# Patient Record
Sex: FEMALE | Race: WHITE | HISPANIC OR LATINO | Employment: UNEMPLOYED | URBAN - METROPOLITAN AREA
[De-identification: names, ages, dates, MRNs, and addresses within clinical notes are randomized per-mention and may not be internally consistent; named-entity substitution may affect disease eponyms.]

---

## 2017-10-03 PROBLEM — B02.21 RAMSAY HUNT SYNDROME (GENICULATE HERPES ZOSTER): Status: ACTIVE | Noted: 2017-10-03

## 2021-08-01 ENCOUNTER — HOME HEALTH ADMISSION (OUTPATIENT)
Dept: HOME HEALTH SERVICES | Facility: HOME HEALTHCARE | Age: 61
End: 2021-08-01

## 2022-12-02 ENCOUNTER — HOSPITAL ENCOUNTER (OUTPATIENT)
Facility: MEDICAL CENTER | Age: 62
End: 2022-12-02
Attending: SURGERY | Admitting: SURGERY
Payer: COMMERCIAL

## 2022-12-08 ENCOUNTER — PRE-ADMISSION TESTING (OUTPATIENT)
Dept: ADMISSIONS | Facility: MEDICAL CENTER | Age: 62
End: 2022-12-08
Attending: SURGERY
Payer: COMMERCIAL

## 2022-12-08 VITALS — WEIGHT: 122.14 LBS | HEIGHT: 62 IN | BODY MASS INDEX: 22.48 KG/M2

## 2022-12-08 DIAGNOSIS — Z01.810 PRE-OPERATIVE CARDIOVASCULAR EXAMINATION: ICD-10-CM

## 2022-12-08 DIAGNOSIS — Z01.812 PRE-OPERATIVE LABORATORY EXAMINATION: ICD-10-CM

## 2022-12-08 LAB
ANION GAP SERPL CALC-SCNC: 14 MMOL/L (ref 7–16)
BASOPHILS # BLD AUTO: 1.2 % (ref 0–1.8)
BASOPHILS # BLD: 0.08 K/UL (ref 0–0.12)
BUN SERPL-MCNC: 29 MG/DL (ref 8–22)
CALCIUM SERPL-MCNC: 11.1 MG/DL (ref 8.5–10.5)
CHLORIDE SERPL-SCNC: 88 MMOL/L (ref 96–112)
CO2 SERPL-SCNC: 36 MMOL/L (ref 20–33)
CREAT SERPL-MCNC: 6.01 MG/DL (ref 0.5–1.4)
EKG IMPRESSION: NORMAL
EOSINOPHIL # BLD AUTO: 0.05 K/UL (ref 0–0.51)
EOSINOPHIL NFR BLD: 0.8 % (ref 0–6.9)
ERYTHROCYTE [DISTWIDTH] IN BLOOD BY AUTOMATED COUNT: 57.4 FL (ref 35.9–50)
GFR SERPLBLD CREATININE-BSD FMLA CKD-EPI: 7 ML/MIN/1.73 M 2
GLUCOSE SERPL-MCNC: 92 MG/DL (ref 65–99)
HCT VFR BLD AUTO: 37.6 % (ref 37–47)
HGB BLD-MCNC: 11.7 G/DL (ref 12–16)
IMM GRANULOCYTES # BLD AUTO: 0.02 K/UL (ref 0–0.11)
IMM GRANULOCYTES NFR BLD AUTO: 0.3 % (ref 0–0.9)
LYMPHOCYTES # BLD AUTO: 1.35 K/UL (ref 1–4.8)
LYMPHOCYTES NFR BLD: 21 % (ref 22–41)
MCH RBC QN AUTO: 33.1 PG (ref 27–33)
MCHC RBC AUTO-ENTMCNC: 31.1 G/DL (ref 33.6–35)
MCV RBC AUTO: 106.5 FL (ref 81.4–97.8)
MONOCYTES # BLD AUTO: 0.63 K/UL (ref 0–0.85)
MONOCYTES NFR BLD AUTO: 9.8 % (ref 0–13.4)
NEUTROPHILS # BLD AUTO: 4.3 K/UL (ref 2–7.15)
NEUTROPHILS NFR BLD: 66.9 % (ref 44–72)
NRBC # BLD AUTO: 0 K/UL
NRBC BLD-RTO: 0 /100 WBC
PLATELET # BLD AUTO: 215 K/UL (ref 164–446)
PMV BLD AUTO: 10.3 FL (ref 9–12.9)
POTASSIUM SERPL-SCNC: 6.4 MMOL/L (ref 3.6–5.5)
RBC # BLD AUTO: 3.53 M/UL (ref 4.2–5.4)
SODIUM SERPL-SCNC: 138 MMOL/L (ref 135–145)
WBC # BLD AUTO: 6.4 K/UL (ref 4.8–10.8)

## 2022-12-08 PROCEDURE — 93010 ELECTROCARDIOGRAM REPORT: CPT | Performed by: INTERNAL MEDICINE

## 2022-12-08 PROCEDURE — 85025 COMPLETE CBC W/AUTO DIFF WBC: CPT

## 2022-12-08 PROCEDURE — 80048 BASIC METABOLIC PNL TOTAL CA: CPT

## 2022-12-08 PROCEDURE — 93005 ELECTROCARDIOGRAM TRACING: CPT

## 2022-12-08 PROCEDURE — 36415 COLL VENOUS BLD VENIPUNCTURE: CPT

## 2022-12-08 RX ORDER — LOSARTAN POTASSIUM 100 MG/1
100 TABLET ORAL DAILY
COMMUNITY
End: 2022-12-08

## 2022-12-08 RX ORDER — TERAZOSIN 2 MG/1
2 CAPSULE ORAL NIGHTLY
Status: ON HOLD | COMMUNITY
End: 2022-12-26

## 2022-12-08 RX ORDER — ATORVASTATIN CALCIUM 10 MG/1
40 TABLET, FILM COATED ORAL DAILY
Status: ON HOLD | COMMUNITY
End: 2022-12-26

## 2022-12-08 RX ORDER — CINACALCET 30 MG/1
30 TABLET, FILM COATED ORAL
Status: ON HOLD | COMMUNITY
End: 2022-12-26

## 2022-12-08 RX ORDER — ONDANSETRON 4 MG/1
4 TABLET, ORALLY DISINTEGRATING ORAL EVERY 8 HOURS PRN
Status: ON HOLD | COMMUNITY
End: 2022-12-26

## 2022-12-08 RX ORDER — PANTOPRAZOLE SODIUM 40 MG/1
40 TABLET, DELAYED RELEASE ORAL DAILY
COMMUNITY

## 2022-12-08 RX ORDER — SEVELAMER CARBONATE 800 MG/1
TABLET, FILM COATED ORAL
COMMUNITY
Start: 2022-10-03 | End: 2022-12-08

## 2022-12-08 ASSESSMENT — FIBROSIS 4 INDEX: FIB4 SCORE: 1.16

## 2022-12-26 ENCOUNTER — ANESTHESIA (OUTPATIENT)
Dept: SURGERY | Facility: MEDICAL CENTER | Age: 62
End: 2022-12-26
Payer: COMMERCIAL

## 2022-12-26 ENCOUNTER — HOSPITAL ENCOUNTER (OUTPATIENT)
Facility: MEDICAL CENTER | Age: 62
End: 2022-12-26
Attending: SURGERY | Admitting: SURGERY
Payer: COMMERCIAL

## 2022-12-26 ENCOUNTER — ANESTHESIA EVENT (OUTPATIENT)
Dept: SURGERY | Facility: MEDICAL CENTER | Age: 62
End: 2022-12-26
Payer: COMMERCIAL

## 2022-12-26 VITALS
DIASTOLIC BLOOD PRESSURE: 76 MMHG | BODY MASS INDEX: 23.61 KG/M2 | OXYGEN SATURATION: 96 % | WEIGHT: 128.31 LBS | RESPIRATION RATE: 18 BRPM | TEMPERATURE: 97.1 F | SYSTOLIC BLOOD PRESSURE: 156 MMHG | HEIGHT: 62 IN | HEART RATE: 82 BPM

## 2022-12-26 PROCEDURE — C1713 ANCHOR/SCREW BN/BN,TIS/BN: HCPCS | Performed by: SURGERY

## 2022-12-26 PROCEDURE — 160046 HCHG PACU - 1ST 60 MINS PHASE II: Performed by: SURGERY

## 2022-12-26 PROCEDURE — 700111 HCHG RX REV CODE 636 W/ 250 OVERRIDE (IP): Performed by: ANESTHESIOLOGY

## 2022-12-26 PROCEDURE — 160009 HCHG ANES TIME/MIN: Performed by: SURGERY

## 2022-12-26 PROCEDURE — 160028 HCHG SURGERY MINUTES - 1ST 30 MINS LEVEL 3: Performed by: SURGERY

## 2022-12-26 PROCEDURE — A9270 NON-COVERED ITEM OR SERVICE: HCPCS | Performed by: ANESTHESIOLOGY

## 2022-12-26 PROCEDURE — 700105 HCHG RX REV CODE 258: Performed by: ANESTHESIOLOGY

## 2022-12-26 PROCEDURE — 160036 HCHG PACU - EA ADDL 30 MINS PHASE I: Performed by: SURGERY

## 2022-12-26 PROCEDURE — 01844 ANES VASC SHUNT/SHUNT REVJ: CPT | Performed by: ANESTHESIOLOGY

## 2022-12-26 PROCEDURE — 700101 HCHG RX REV CODE 250: Performed by: SURGERY

## 2022-12-26 PROCEDURE — 700102 HCHG RX REV CODE 250 W/ 637 OVERRIDE(OP): Performed by: ANESTHESIOLOGY

## 2022-12-26 PROCEDURE — 160035 HCHG PACU - 1ST 60 MINS PHASE I: Performed by: SURGERY

## 2022-12-26 PROCEDURE — 160047 HCHG PACU  - EA ADDL 30 MINS PHASE II: Performed by: SURGERY

## 2022-12-26 PROCEDURE — 700111 HCHG RX REV CODE 636 W/ 250 OVERRIDE (IP): Performed by: SURGERY

## 2022-12-26 PROCEDURE — 160048 HCHG OR STATISTICAL LEVEL 1-5: Performed by: SURGERY

## 2022-12-26 PROCEDURE — 160039 HCHG SURGERY MINUTES - EA ADDL 1 MIN LEVEL 3: Performed by: SURGERY

## 2022-12-26 PROCEDURE — 160025 RECOVERY II MINUTES (STATS): Performed by: SURGERY

## 2022-12-26 PROCEDURE — 700105 HCHG RX REV CODE 258: Performed by: SURGERY

## 2022-12-26 PROCEDURE — 160002 HCHG RECOVERY MINUTES (STAT): Performed by: SURGERY

## 2022-12-26 RX ORDER — OXYCODONE HCL 5 MG/5 ML
10 SOLUTION, ORAL ORAL
Status: COMPLETED | OUTPATIENT
Start: 2022-12-26 | End: 2022-12-26

## 2022-12-26 RX ORDER — HYDRALAZINE HYDROCHLORIDE 20 MG/ML
5 INJECTION INTRAMUSCULAR; INTRAVENOUS
Status: DISCONTINUED | OUTPATIENT
Start: 2022-12-26 | End: 2022-12-26 | Stop reason: HOSPADM

## 2022-12-26 RX ORDER — SODIUM CHLORIDE 9 MG/ML
INJECTION, SOLUTION INTRAVENOUS
Status: DISCONTINUED | OUTPATIENT
Start: 2022-12-26 | End: 2022-12-26 | Stop reason: SURG

## 2022-12-26 RX ORDER — HALOPERIDOL 5 MG/ML
1 INJECTION INTRAMUSCULAR
Status: DISCONTINUED | OUTPATIENT
Start: 2022-12-26 | End: 2022-12-26 | Stop reason: HOSPADM

## 2022-12-26 RX ORDER — OXYCODONE HCL 5 MG/5 ML
5 SOLUTION, ORAL ORAL
Status: COMPLETED | OUTPATIENT
Start: 2022-12-26 | End: 2022-12-26

## 2022-12-26 RX ORDER — ONDANSETRON 2 MG/ML
4 INJECTION INTRAMUSCULAR; INTRAVENOUS
Status: DISCONTINUED | OUTPATIENT
Start: 2022-12-26 | End: 2022-12-26 | Stop reason: HOSPADM

## 2022-12-26 RX ORDER — SODIUM CHLORIDE 9 MG/ML
INJECTION, SOLUTION INTRAVENOUS ONCE
Status: COMPLETED | OUTPATIENT
Start: 2022-12-26 | End: 2022-12-26

## 2022-12-26 RX ORDER — DEXAMETHASONE SODIUM PHOSPHATE 4 MG/ML
INJECTION, SOLUTION INTRA-ARTICULAR; INTRALESIONAL; INTRAMUSCULAR; INTRAVENOUS; SOFT TISSUE PRN
Status: DISCONTINUED | OUTPATIENT
Start: 2022-12-26 | End: 2022-12-26 | Stop reason: SURG

## 2022-12-26 RX ORDER — HYDROMORPHONE HYDROCHLORIDE 1 MG/ML
0.2 INJECTION, SOLUTION INTRAMUSCULAR; INTRAVENOUS; SUBCUTANEOUS
Status: DISCONTINUED | OUTPATIENT
Start: 2022-12-26 | End: 2022-12-26 | Stop reason: HOSPADM

## 2022-12-26 RX ORDER — HEPARIN SODIUM 5000 [USP'U]/ML
INJECTION, SOLUTION INTRAVENOUS; SUBCUTANEOUS
Status: DISCONTINUED | OUTPATIENT
Start: 2022-12-26 | End: 2022-12-26 | Stop reason: HOSPADM

## 2022-12-26 RX ORDER — MEPERIDINE HYDROCHLORIDE 25 MG/ML
12.5 INJECTION INTRAMUSCULAR; INTRAVENOUS; SUBCUTANEOUS
Status: DISCONTINUED | OUTPATIENT
Start: 2022-12-26 | End: 2022-12-26 | Stop reason: HOSPADM

## 2022-12-26 RX ORDER — BUPIVACAINE HYDROCHLORIDE 5 MG/ML
INJECTION, SOLUTION EPIDURAL; INTRACAUDAL
Status: DISCONTINUED | OUTPATIENT
Start: 2022-12-26 | End: 2022-12-26 | Stop reason: HOSPADM

## 2022-12-26 RX ORDER — DIPHENHYDRAMINE HYDROCHLORIDE 50 MG/ML
12.5 INJECTION INTRAMUSCULAR; INTRAVENOUS
Status: DISCONTINUED | OUTPATIENT
Start: 2022-12-26 | End: 2022-12-26 | Stop reason: HOSPADM

## 2022-12-26 RX ORDER — SODIUM CHLORIDE, SODIUM GLUCONATE, SODIUM ACETATE, POTASSIUM CHLORIDE AND MAGNESIUM CHLORIDE 526; 502; 368; 37; 30 MG/100ML; MG/100ML; MG/100ML; MG/100ML; MG/100ML
500 INJECTION, SOLUTION INTRAVENOUS CONTINUOUS
Status: DISCONTINUED | OUTPATIENT
Start: 2022-12-26 | End: 2022-12-26 | Stop reason: HOSPADM

## 2022-12-26 RX ORDER — HYDROMORPHONE HYDROCHLORIDE 1 MG/ML
0.6 INJECTION, SOLUTION INTRAMUSCULAR; INTRAVENOUS; SUBCUTANEOUS
Status: DISCONTINUED | OUTPATIENT
Start: 2022-12-26 | End: 2022-12-26 | Stop reason: HOSPADM

## 2022-12-26 RX ORDER — HYDROMORPHONE HYDROCHLORIDE 1 MG/ML
0.4 INJECTION, SOLUTION INTRAMUSCULAR; INTRAVENOUS; SUBCUTANEOUS
Status: DISCONTINUED | OUTPATIENT
Start: 2022-12-26 | End: 2022-12-26 | Stop reason: HOSPADM

## 2022-12-26 RX ORDER — CEFAZOLIN SODIUM 1 G/3ML
INJECTION, POWDER, FOR SOLUTION INTRAMUSCULAR; INTRAVENOUS PRN
Status: DISCONTINUED | OUTPATIENT
Start: 2022-12-26 | End: 2022-12-26 | Stop reason: SURG

## 2022-12-26 RX ORDER — ONDANSETRON 2 MG/ML
INJECTION INTRAMUSCULAR; INTRAVENOUS PRN
Status: DISCONTINUED | OUTPATIENT
Start: 2022-12-26 | End: 2022-12-26 | Stop reason: SURG

## 2022-12-26 RX ORDER — LABETALOL HYDROCHLORIDE 5 MG/ML
5 INJECTION, SOLUTION INTRAVENOUS
Status: DISCONTINUED | OUTPATIENT
Start: 2022-12-26 | End: 2022-12-26 | Stop reason: HOSPADM

## 2022-12-26 RX ORDER — HEPARIN SODIUM,PORCINE 1000/ML
VIAL (ML) INJECTION PRN
Status: DISCONTINUED | OUTPATIENT
Start: 2022-12-26 | End: 2022-12-26 | Stop reason: SURG

## 2022-12-26 RX ADMIN — PROPOFOL 150 MG: 10 INJECTION, EMULSION INTRAVENOUS at 07:48

## 2022-12-26 RX ADMIN — SODIUM CHLORIDE: 9 INJECTION, SOLUTION INTRAVENOUS at 06:44

## 2022-12-26 RX ADMIN — FENTANYL CITRATE 50 MCG: 50 INJECTION, SOLUTION INTRAMUSCULAR; INTRAVENOUS at 07:55

## 2022-12-26 RX ADMIN — ONDANSETRON 4 MG: 2 INJECTION INTRAMUSCULAR; INTRAVENOUS at 08:31

## 2022-12-26 RX ADMIN — OXYCODONE HYDROCHLORIDE 5 MG: 5 SOLUTION ORAL at 09:17

## 2022-12-26 RX ADMIN — CEFAZOLIN 2 G: 330 INJECTION, POWDER, FOR SOLUTION INTRAMUSCULAR; INTRAVENOUS at 07:50

## 2022-12-26 RX ADMIN — HEPARIN SODIUM 3000 UNITS: 1000 INJECTION, SOLUTION INTRAVENOUS; SUBCUTANEOUS at 08:08

## 2022-12-26 RX ADMIN — SODIUM CHLORIDE: 9 INJECTION, SOLUTION INTRAVENOUS at 07:47

## 2022-12-26 RX ADMIN — FENTANYL CITRATE 25 MCG: 50 INJECTION, SOLUTION INTRAMUSCULAR; INTRAVENOUS at 09:50

## 2022-12-26 RX ADMIN — FENTANYL CITRATE 25 MCG: 50 INJECTION, SOLUTION INTRAMUSCULAR; INTRAVENOUS at 10:25

## 2022-12-26 RX ADMIN — FENTANYL CITRATE 50 MCG: 50 INJECTION, SOLUTION INTRAMUSCULAR; INTRAVENOUS at 07:45

## 2022-12-26 RX ADMIN — OXYCODONE HYDROCHLORIDE 5 MG: 5 SOLUTION ORAL at 09:16

## 2022-12-26 RX ADMIN — DEXAMETHASONE SODIUM PHOSPHATE 4 MG: 4 INJECTION, SOLUTION INTRA-ARTICULAR; INTRALESIONAL; INTRAMUSCULAR; INTRAVENOUS; SOFT TISSUE at 08:31

## 2022-12-26 ASSESSMENT — PAIN DESCRIPTION - PAIN TYPE
TYPE: SURGICAL PAIN

## 2022-12-26 ASSESSMENT — PAIN SCALES - GENERAL: PAIN_LEVEL: 1

## 2022-12-26 ASSESSMENT — FIBROSIS 4 INDEX: FIB4 SCORE: 1.5

## 2022-12-26 NOTE — OR NURSING
0855 Report from Margarita.  Pt VSS, PIV infusing without issue, arm elevated, warm blankets in place, good pulses palpated.  Pt resting at this time.    0915 Pt waking up, complaints of 5/10 pain.  Pt tolerating sips of apple juice well, denies nausea.  Pt medicated with PO oxycodone.   0930  Attempted to call pt friend, CHARLENE with POC.    0945 Pt with worsening pain, medicated with the additional 5 mg of oxycodone.    0950 Pt medicated with 25 mcg IV Fentanyl for continued pain.   1006 Pt waking up, pain improved. Awaiting to move pt to discharge.   1015 D/C instructions reviewed with patient in Georgian, answered all questions and concerns.    Report given to Sarika GARCIA in discharge.    1025 Pt complaints of more pain, medicated with IV fentanyl per order.   1045 Pt meets criteria for phase 2, transported to phase 2.

## 2022-12-26 NOTE — OR SURGEON
Immediate Post OP Note    PreOp Diagnosis: End-stage renal disease, severely aneurysmal right upper arm arteriovenous fistula.      PostOp Diagnosis: Same.      Procedure(s):  LEFT BRACHIOCEPHALIC ARTERIOVENOUS FISTULA CREATION. - Wound Class: Clean    Surgeon(s):  Mauro Saavedra M.D.    Anesthesiologist/Type of Anesthesia:  Anesthesiologist: Hakeem Jones M.D./General    Surgical Staff:  Circulator: Sis Shelby R.N.  Scrub Person: Chato Alvarez    Specimens removed if any:  * No specimens in log *    Estimated Blood Loss: 2 mL.    Findings: Good bruits postop.    Complications: None.    Dictated, #[id number].        12/26/2022 8:42 AM Mauro Saavedra M.D.

## 2022-12-26 NOTE — ANESTHESIA PREPROCEDURE EVALUATION
Case: [id number] Date/Time: 12/26/22 0715    Procedure: LEFT BRACHIOCEPHALIC ARTERIOVENOUS FISTULA CREATION.    Pre-op diagnosis: END STAGE RENAL DISEASE    [facility] SURGERY [facility]    Surgeons: Mauro Saavedra M.D.          Relevant Problems   CARDIAC   (positive) Hypertension         (positive) End stage renal disease on dialysis (HCC)       Physical Exam    Airway   Mallampati: II  TM distance: >3 FB  Neck ROM: full       Cardiovascular - normal exam  Rhythm: regular  Rate: normal  (-) murmur     Dental - normal exam           Pulmonary - normal exam  Breath sounds clear to auscultation     Abdominal    Neurological - normal exam                 Anesthesia Plan    ASA 4   ASA physical status 4 criteria: ESRD not undergoing regularly scheduled dialysis    Plan - general       Airway plan will be LMA          Induction: intravenous    Postoperative Plan: Postoperative administration of opioids is intended.    Pertinent diagnostic labs and testing reviewed    Informed Consent:    Anesthetic plan and risks discussed with patient.    Use of blood products discussed with: patient whom consented to blood products.

## 2022-12-26 NOTE — OP REPORT
DATE OF SERVICE:  12/26/2022     SURGEON:  Mauro Saavedra MD     ANESTHESIOLOGIST:  Hakeem Jones MD     TYPE OF ANESTHESIA:  General anesthesia.     PREOPERATIVE DIAGNOSES:  End-stage renal disease and severely aneurysmal right   upper arm AV fistula.     POSTOPERATIVE DIAGNOSES:  End-stage renal disease and severely aneurysmal   right upper arm AV fistula.     PROCEDURE:  Left brachiocephalic arteriovenous fistula creation.     INDICATIONS FOR PROCEDURE:  This is a pleasant 62-year-old female with   multiple medical problems including end-stage renal disease, on hemodialysis   via a severely aneurysmal right upper arm arteriovenous fistula.  The patient   was referred to see me for new access creation.  Vein mapping showed the left   upper arm cephalic vein to have a good caliber for use in AV fistula creation.    Discussion was made with the patient about undergoing a left brachiocephalic   fistula creation with plan to ligate the aneurysmal right upper arm AV   fistula once the new fistula is functional.  The patient would like to   proceed, fully understanding all risks.     DESCRIPTION OF PROCEDURE:  Informed consent was obtained.  The patient was   taken to the operating room and was placed in the supine position.  Sequential   compression devices were applied.  The patient was given Ancef intravenously.  General anesthesia was induced.     Next, her left upper extremity was sterilely prepped and draped in the normal   fashion.  The skin and subcutaneous tissues in the proximal forearm over the   brachial artery and cephalic vein was anesthetized with 10 mL of 0.25%   Marcaine solution.  An incision was made between the 2 structures.  The   incision was extended through subcutaneous tissue using the electrocautery.    The cephalic vein was identified and carefully dissected free from the   surrounding tissue.  It was found to be connected to the median antecubital   vein.  The median antecubital vein  was also found to be connected with the   upper arm basilic vein.  The brachial artery was identified and carefully   dissected.     The patient was given heparin 3000 units intravenously.  After the heparin was   allowed to circulate systemically for 3 minutes, the cephalic vein was   ligated at its junction with the median antecubital vein using 3-0 ties,   clipped with Hemoclips and divided above the ligation.  Vascular control of   the brachial artery was obtained with vascular clamps.  The arteriotomy was   made on the distal brachial artery.  The cephalic vein was opened posteriorly   and anastomosed end-to-side to the brachial artery using running 6-0 Prolene   suture.  Prior to completing the anastomosis, backbleeding and flushing were   obtained.  The anastomosis was completed.  Flow was first restored into the   fistula and then into the distal brachial artery.  A sterile Doppler probe was   brought into the operative field.  Excellent Doppler flow signals were obtained   over the fistula.  Excellent Doppler flow signals with significant diastolic   flow component was also obtained over the brachial artery, both above and   below the anastomosis.     The wound was irrigated and was found to have excellent hemostasis.  The   Wound was closed subcutaneously with running 3-0 Vicryl suture and   subcuticularly with 4-0 Monocryl suture.  The wound was cleaned and sterile   dressing was applied.     ESTIMATED BLOOD LOSS:  2 mL.     COUNTS:  Sponge and instrument count was correct x2.     The patient was then awakened, extubated, taken to recovery area in stable   condition with good bruits over the fistula and intact neurovascular   examination of the left hand.        ______________________________  MD BARRINGTON Woody/SAMSON/MONIKA    DD:  12/26/2022 08:47  DT:  12/26/2022 09:29    Job#:  [id number]    CC:MD Juliette Sapp DO

## 2022-12-26 NOTE — PROGRESS NOTES
Med Rec Complete per patient  Allergies Reviewed with patient  Patient's Preferred Pharmacy:  [facility] in [place], [place]    ([facility] [place]    Patient reports that, last week, she completed a 5 day course of antibiotics that she obtained from [facility].  She does not recall the name of this antibiotic at this time.

## 2022-12-26 NOTE — ANESTHESIA TIME REPORT
Anesthesia Start and Stop Event Times     Date Time Event    12/26/2022 0721 Ready for Procedure     0745 Anesthesia Start     0842 Anesthesia Stop        Responsible Staff  12/26/22    Name Role Begin End    Hakeem Jones M.D. Anesth 0745 0842        Overtime Reason:  no overtime (within assigned shift)    Comments: MALIHA 1st CALL HOLIDAY

## 2022-12-26 NOTE — DISCHARGE INSTRUCTIONS
If any questions arise, call your provider.  If your provider is not available, please feel free to call the Surgical Center at [phone].    MEDICATIONS: Resume taking daily medication.  Take prescribed pain medication with food.  If no medication is prescribed, you may take non-aspirin pain medication if needed.  PAIN MEDICATION CAN BE VERY CONSTIPATING.  Take a stool softener or laxative such as senokot, pericolace, or milk of magnesia if needed.    Last pain medication given at 9:15 am next dose after 1:15 pm. May take Tylenol anytime.      What to Expect Post Anesthesia    Rest and take it easy for the first 24 hours.  A responsible adult is recommended to remain with you during that time.  It is normal to feel sleepy.  We encourage you to not do anything that requires balance, judgment or coordination.    FOR 24 HOURS DO NOT:  Drive, operate machinery or run household appliances.  Drink beer or alcoholic beverages.  Make important decisions or sign legal documents.    To avoid nausea, slowly advance diet as tolerated, avoiding spicy or greasy foods for the first day.  Add more substantial food to your diet according to your provider's instructions.  Babies can be fed formula or breast milk as soon as they are hungry.  INCREASE FLUIDS AND FIBER TO AVOID CONSTIPATION.    MILD FLU-LIKE SYMPTOMS ARE NORMAL.  YOU MAY EXPERIENCE GENERALIZED MUSCLE ACHES, THROAT IRRITATION, HEADACHE AND/OR SOME NAUSEA.      Activity: No lifting more than half gallon of milk using left hand for 2 weeks time. Keep left arm straight and elevated as much as possible, for at least 2 weeks time. May remove dressing after 3 days and shower.  No bath or hot tub for 2 weeks. Resume home medication.  May take Tylenol as needed for pain. Follow-up with Dr. Saavedra at the [facility].  Call [facility] for appointment and for any problems.    Instrucciones Para [place]  (Home Care Instructions)    ACTIVIDAD: Descanse y tome todo con  mucha calma las primeras 24 horas después de cuba cirugía.  Jasmin persona adulta responsable debe permanecer con usted avelino kari periodo de tiempo.  Es normal sentirse sonoliento o sonolienta avelino esas primeras horas.  Le recomendamos que no valentina nada que requiera equilibrio, serenity decisiones a mucha coordinación de cuba parte.    NO VALENTINA ESTO PURANTE LAS PRIMERAS 24 HORAS:   Manejar o conducir algún vehiculo, operar maquinarias o utilizar electrodomesticos.   Beber cerveza o algún otro tipo de bebida alcohólica.   Serenity decisiones importantes o firmar documentos legales.    INSTRUCCIONES ESPECIALES:  Activity: No lifting more than half gallon of milk using left hand for 2 weeks time. Keep left arm straight and elevated as much as possible, for at least 2 weeks time. May remove dressing after 3 days and shower.  No bath or hot tub for 2 weeks. Resume home medication.  May take Tylenol as needed for pain. Follow-up with Dr. Saavedra at the [facility].  Call [facility] for appointment and for any problems.    DIETA: Para evitar las nauseas, prosiga despacito con cuba dieta a medida que pueda ir tolerándola mejor, evite comidas muy condimentadas o grasosas avelino kari primer día.  Vaya agregando comidas más substanciadas a cuba dieta a medida que asi lo indique cuba médica.  Los bebés pueden beber leche preparada o formula, ásl juan también leche del seno de la madre a medida que vayan teniendo hambre.  SIGA AGREGANDO LIQUIDOS Y COMIDAS CON FIBRA PARA EVITAR ESTREÑIMIENTO.    JUAN BAÑARSE Y CAMBIAR LOS VENDAJES DE LA CIRUGIA: Puede banarse juan normal empezando en 24 horas.      MEDICAMENTOS/MEDICINAS:  Vuelva a serenity jovon medicamentos diarios.  [place] los medicamentos que se le prescribe con un poco de comida.  Si no le prescribe ningún tipo de medicamento, entonces puede serenity medicinas para el dolor que no contienen aspirina, si las necesita.  LAS MEDICINAS PARA EL DOLOR PUEDEN ESTREÑIRLE MUCHO.  [place] un suavizante para  el excremento o materia fecal (stool softener) o un laxativo mitzy por ejemplo: senokot, pericolase, o leche de magnesia, si lo necesita.    La prescripción la administro none given.  La ultima sosis de medicina para el dolor fue administrada 9:15 am, proxima dosis despues de 1:15 pm, puede nadya Tylenol cuando necessita.     Se debe hacer hal consulta medica con el doctor. Llame para hacer la rafael.    Usted debe LIAMAR A CUBA MEDICO si tiene los siguientes síntomas:   -   Hal fiebre más akiko de 101 grados Fahrenheit.   -   Un dolor incesante aún con los medicamentos, o nauseas y vómito persistente.   -   Un sangrado excesivo (jelani que traspasa los vendajes o gasas) o algúln tipo de drenaje inesperado que proviene de la henda.     -   Un color suero exagerado o hinchazón alrededor del área en donde se le hizo incisión o kraig, o un drenaje de pus o con olor pranay proveniente de la henda.   -    La inhabilidad de orinar o vaciar cuba vejiga en 8 horas.   -    Problemas con a respiración o zoey en el pecho.    Usted debe llamar al 911 si se presentan problemas con el dolor al respirar o el pecho.  Si no se puede ponnoer en comunicación con un medica o con el centro de cirugía, usted debe ir a la estación de emergencia (emergency room) más cercana o a un centro de atención de urgencia (urgent care center).  El teléfono del medico es: [phone].    LOS SÍNTOMAS DE UN LEVE RESFRIO SON MUY NORMALES.  ADEMÁS USTED PUEDE LLEGAR A SENTIR ZOEY GENERALES DE MÚSCULOS, IRRITACIÓN EN LA GARGANTA, ZOEY DE LINDSEY Y/O UN POCO DE NAUSEAS.    Sie tiene alguna pregunta, llame a cuba médico.  Si cuba médico no se encuentra disponible, por favor llame al Centro de Cirugía at [phone].  el Centro está abierto de Lunes a Viernes desde las 7:00 de la manana hasta las 5:00 de la noche.      Mi firma a continuación indica que he recibido y entiendco estas instrucciones acera de los cuidados en la casa (Home Care  Instructions)    Usted recibirá hal encuesta en la correspondencia en las siguientes semanas y le pedimos que por favor tome un momento para completar junito encuesta y regresaría a hosotros.  Nuestro objetivó es brindarle un cuidado muy javier y par lo tanto apreciamos jovon coméntanos.  Muchas yong por joslyn escogido el [facility].

## 2022-12-26 NOTE — ANESTHESIA PROCEDURE NOTES
Airway    Date/Time: 12/26/2022 7:49 AM  Performed by: Hakeem Jones M.D.  Authorized by: Hakeem Jones M.D.     Location:  OR  Urgency:  Elective  Indications for Airway Management:  Anesthesia      Spontaneous Ventilation: absent    Sedation Level:  Deep  Preoxygenated: Yes    Final Airway Type:  Supraglottic airway  Final Supraglottic Airway:  Standard LMA    SGA Size:  4  Number of Attempts at Approach:  1

## 2022-12-26 NOTE — OR NURSING
1053:  Pt from pacu.  Awake and oriented.  Ariane to provide Pashto translation.  Dc instructions reviewed with pt through Ariane.  Pt denies questions.  Verbalizes understanding in english that she is to go to Dialysis as soon as she is discharged.  Left fistula site CDI with bruit present to auscultation.      1100:  taking po fluids and sleeping.    1140:  Pt up and dressed.  Pain remains at 2-3.    1200:  DC instructions reviewed with Yanci and directions to go directly to [facility] after leaving here.      1220:  Pt via WC to car.  All belongings with patient at Discharge.

## 2022-12-26 NOTE — ANESTHESIA POSTPROCEDURE EVALUATION
Patient: Yasemin Villagran    Procedure Summary     Date: 12/26/22 Room / Location: [place] [id number] / SURGERY [facility]    Anesthesia Start: 0745 Anesthesia Stop: 0842    Procedure: LEFT BRACHIOCEPHALIC ARTERIOVENOUS FISTULA CREATION. (Left: Arm Upper) Diagnosis: (END STAGE RENAL DISEASE)    Surgeons: Mauro Saavedra M.D. Responsible Provider: Hakeem Jones M.D.    Anesthesia Type: general ASA Status: 4          Final Anesthesia Type: general  Last vitals  BP   Blood Pressure: 134/63    Temp   36.1 °C (97 °F)    Pulse   82   Resp   18    SpO2   96 %      Anesthesia Post Evaluation    Patient location during evaluation: PACU  Patient participation: complete - patient participated  Level of consciousness: awake and alert  Pain score: 1    Airway patency: patent  Anesthetic complications: no  Cardiovascular status: hemodynamically stable  Respiratory status: acceptable  Hydration status: euvolemic    PONV: none          No notable events documented.     Nurse Pain Score: 0 (NPRS)

## 2023-06-06 ENCOUNTER — OFFICE VISIT (OUTPATIENT)
Dept: VASCULAR SURGERY | Facility: MEDICAL CENTER | Age: 63
End: 2023-06-06
Payer: COMMERCIAL

## 2023-06-06 VITALS
OXYGEN SATURATION: 98 % | SYSTOLIC BLOOD PRESSURE: 112 MMHG | HEART RATE: 70 BPM | HEIGHT: 63 IN | BODY MASS INDEX: 21.45 KG/M2 | TEMPERATURE: 97 F | DIASTOLIC BLOOD PRESSURE: 62 MMHG | WEIGHT: 121.03 LBS

## 2023-06-06 DIAGNOSIS — N18.6 ESRD (END STAGE RENAL DISEASE) (HCC): ICD-10-CM

## 2023-06-06 PROCEDURE — 3078F DIAST BP <80 MM HG: CPT | Performed by: SURGERY

## 2023-06-06 PROCEDURE — 99213 OFFICE O/P EST LOW 20 MIN: CPT | Performed by: SURGERY

## 2023-06-06 PROCEDURE — 3074F SYST BP LT 130 MM HG: CPT | Performed by: SURGERY

## 2023-06-06 ASSESSMENT — FIBROSIS 4 INDEX: FIB4 SCORE: 1.71

## 2023-06-06 NOTE — PROGRESS NOTES
"                 VASCULAR SURGERY                 Clinic Progress Note  _________________________________    6/6/2023    Vitals  /62 (BP Location: Left leg, Patient Position: Sitting, BP Cuff Size: Adult)   Pulse 70   Temp 36.1 °C (97 °F) (Temporal)   Ht 1.6 m (5' 3\")   Wt 54.9 kg (121 lb 0.5 oz)   SpO2 98%   BMI 21.44 kg/m²     This is a very pleasant 62-year-old dialysis patient who has a degenerated right upper extremity brachiocephalic AV fistula with aneurysmal changes.  It appears as though there are when placed a new brachiocephalic fistula on the left side.  This is though this AV fistula infiltrated during an attempt to try to dialyze through it.  The fistula itself looks great.  Is got excellent flow with a good thrill and is of reasonable size.  Fortunately the patient has the contralateral access which they are using in the interim.  See recommendations below.            A/P)  Painful left upper extremity AV fistula likely secondary to fistula infiltration.  Fistula remains patent.  Recommend allowing approximately 2 weeks for the left fistula to heal before reattempting access.  There is no role for intervention in the left new fistula.  Is functioning well just needs to heal.        Dat Rosales MD  [facility]  [phone]  [address], [place] [address]  "

## 2023-12-18 ENCOUNTER — APPOINTMENT (OUTPATIENT)
Dept: URGENT CARE | Facility: PHYSICIAN GROUP | Age: 63
End: 2023-12-18
Payer: COMMERCIAL

## 2024-06-23 ENCOUNTER — APPOINTMENT (OUTPATIENT)
Dept: RADIOLOGY | Facility: MEDICAL CENTER | Age: 64
DRG: 314 | End: 2024-06-23
Attending: STUDENT IN AN ORGANIZED HEALTH CARE EDUCATION/TRAINING PROGRAM
Payer: COMMERCIAL

## 2024-06-23 ENCOUNTER — HOSPITAL ENCOUNTER (INPATIENT)
Facility: MEDICAL CENTER | Age: 64
LOS: 2 days | DRG: 314 | End: 2024-06-26
Attending: STUDENT IN AN ORGANIZED HEALTH CARE EDUCATION/TRAINING PROGRAM | Admitting: INTERNAL MEDICINE
Payer: COMMERCIAL

## 2024-06-23 DIAGNOSIS — K21.9 GASTROESOPHAGEAL REFLUX DISEASE WITHOUT ESOPHAGITIS: ICD-10-CM

## 2024-06-23 DIAGNOSIS — E83.52 HYPERCALCEMIA: ICD-10-CM

## 2024-06-23 DIAGNOSIS — I31.39 PERICARDIAL EFFUSION: ICD-10-CM

## 2024-06-23 DIAGNOSIS — I30.0 ACUTE IDIOPATHIC PERICARDITIS: ICD-10-CM

## 2024-06-23 DIAGNOSIS — R07.9 CHEST PAIN, UNSPECIFIED TYPE: ICD-10-CM

## 2024-06-23 LAB
ALBUMIN SERPL BCP-MCNC: 3.9 G/DL (ref 3.2–4.9)
ALBUMIN/GLOB SERPL: 1.3 G/DL
ALP SERPL-CCNC: 112 U/L (ref 30–99)
ALT SERPL-CCNC: 11 U/L (ref 2–50)
ANION GAP SERPL CALC-SCNC: 20 MMOL/L (ref 7–16)
AST SERPL-CCNC: 18 U/L (ref 12–45)
BASOPHILS # BLD AUTO: 0.6 % (ref 0–1.8)
BASOPHILS # BLD: 0.08 K/UL (ref 0–0.12)
BILIRUB SERPL-MCNC: 0.5 MG/DL (ref 0.1–1.5)
BUN SERPL-MCNC: 58 MG/DL (ref 8–22)
CALCIUM ALBUM COR SERPL-MCNC: 11.6 MG/DL (ref 8.5–10.5)
CALCIUM SERPL-MCNC: 11.5 MG/DL (ref 8.5–10.5)
CHLORIDE SERPL-SCNC: 93 MMOL/L (ref 96–112)
CO2 SERPL-SCNC: 21 MMOL/L (ref 20–33)
CREAT SERPL-MCNC: 9.54 MG/DL (ref 0.5–1.4)
EKG IMPRESSION: NORMAL
EOSINOPHIL # BLD AUTO: 0.04 K/UL (ref 0–0.51)
EOSINOPHIL NFR BLD: 0.3 % (ref 0–6.9)
ERYTHROCYTE [DISTWIDTH] IN BLOOD BY AUTOMATED COUNT: 61 FL (ref 35.9–50)
GFR SERPLBLD CREATININE-BSD FMLA CKD-EPI: 4 ML/MIN/1.73 M 2
GLOBULIN SER CALC-MCNC: 3.1 G/DL (ref 1.9–3.5)
GLUCOSE SERPL-MCNC: 118 MG/DL (ref 65–99)
HCT VFR BLD AUTO: 34.1 % (ref 37–47)
HGB BLD-MCNC: 10.5 G/DL (ref 12–16)
IMM GRANULOCYTES # BLD AUTO: 0.07 K/UL (ref 0–0.11)
IMM GRANULOCYTES NFR BLD AUTO: 0.5 % (ref 0–0.9)
LYMPHOCYTES # BLD AUTO: 1.14 K/UL (ref 1–4.8)
LYMPHOCYTES NFR BLD: 8.9 % (ref 22–41)
MCH RBC QN AUTO: 32.2 PG (ref 27–33)
MCHC RBC AUTO-ENTMCNC: 30.8 G/DL (ref 32.2–35.5)
MCV RBC AUTO: 104.6 FL (ref 81.4–97.8)
MONOCYTES # BLD AUTO: 1.48 K/UL (ref 0–0.85)
MONOCYTES NFR BLD AUTO: 11.5 % (ref 0–13.4)
NEUTROPHILS # BLD AUTO: 10.06 K/UL (ref 1.82–7.42)
NEUTROPHILS NFR BLD: 78.2 % (ref 44–72)
NRBC # BLD AUTO: 0 K/UL
NRBC BLD-RTO: 0 /100 WBC (ref 0–0.2)
PLATELET # BLD AUTO: 351 K/UL (ref 164–446)
PMV BLD AUTO: 9.4 FL (ref 9–12.9)
POTASSIUM SERPL-SCNC: 5.5 MMOL/L (ref 3.6–5.5)
PROT SERPL-MCNC: 7 G/DL (ref 6–8.2)
RBC # BLD AUTO: 3.26 M/UL (ref 4.2–5.4)
SODIUM SERPL-SCNC: 134 MMOL/L (ref 135–145)
TROPONIN T SERPL-MCNC: 224 NG/L (ref 6–19)
WBC # BLD AUTO: 12.9 K/UL (ref 4.8–10.8)

## 2024-06-23 PROCEDURE — 80053 COMPREHEN METABOLIC PANEL: CPT

## 2024-06-23 PROCEDURE — 36415 COLL VENOUS BLD VENIPUNCTURE: CPT

## 2024-06-23 PROCEDURE — 73030 X-RAY EXAM OF SHOULDER: CPT | Mod: LT

## 2024-06-23 PROCEDURE — 700111 HCHG RX REV CODE 636 W/ 250 OVERRIDE (IP): Performed by: STUDENT IN AN ORGANIZED HEALTH CARE EDUCATION/TRAINING PROGRAM

## 2024-06-23 PROCEDURE — 700101 HCHG RX REV CODE 250: Performed by: STUDENT IN AN ORGANIZED HEALTH CARE EDUCATION/TRAINING PROGRAM

## 2024-06-23 PROCEDURE — 85652 RBC SED RATE AUTOMATED: CPT

## 2024-06-23 PROCEDURE — 71275 CT ANGIOGRAPHY CHEST: CPT

## 2024-06-23 PROCEDURE — 99285 EMERGENCY DEPT VISIT HI MDM: CPT

## 2024-06-23 PROCEDURE — 84484 ASSAY OF TROPONIN QUANT: CPT

## 2024-06-23 PROCEDURE — 96375 TX/PRO/DX INJ NEW DRUG ADDON: CPT

## 2024-06-23 PROCEDURE — 93005 ELECTROCARDIOGRAM TRACING: CPT | Performed by: STUDENT IN AN ORGANIZED HEALTH CARE EDUCATION/TRAINING PROGRAM

## 2024-06-23 PROCEDURE — 85025 COMPLETE CBC W/AUTO DIFF WBC: CPT

## 2024-06-23 PROCEDURE — 93005 ELECTROCARDIOGRAM TRACING: CPT

## 2024-06-23 RX ORDER — LIDOCAINE 4 G/G
1 PATCH TOPICAL ONCE
Status: COMPLETED | OUTPATIENT
Start: 2024-06-23 | End: 2024-06-24

## 2024-06-23 RX ORDER — HYDROMORPHONE HYDROCHLORIDE 1 MG/ML
0.5 INJECTION, SOLUTION INTRAMUSCULAR; INTRAVENOUS; SUBCUTANEOUS ONCE
Status: COMPLETED | OUTPATIENT
Start: 2024-06-23 | End: 2024-06-23

## 2024-06-23 RX ADMIN — HYDROMORPHONE HYDROCHLORIDE 0.5 MG: 1 INJECTION, SOLUTION INTRAMUSCULAR; INTRAVENOUS; SUBCUTANEOUS at 22:59

## 2024-06-23 RX ADMIN — LIDOCAINE 1 PATCH: 4 PATCH TOPICAL at 22:59

## 2024-06-23 ASSESSMENT — FIBROSIS 4 INDEX: FIB4 SCORE: 1.11

## 2024-06-24 ENCOUNTER — APPOINTMENT (OUTPATIENT)
Dept: RADIOLOGY | Facility: MEDICAL CENTER | Age: 64
DRG: 314 | End: 2024-06-24
Attending: NURSE PRACTITIONER
Payer: COMMERCIAL

## 2024-06-24 ENCOUNTER — APPOINTMENT (OUTPATIENT)
Dept: CARDIOLOGY | Facility: MEDICAL CENTER | Age: 64
DRG: 314 | End: 2024-06-24
Attending: INTERNAL MEDICINE
Payer: COMMERCIAL

## 2024-06-24 PROBLEM — J18.9 PNEUMONIA: Status: ACTIVE | Noted: 2024-06-24

## 2024-06-24 PROBLEM — R07.9 CHEST PAIN: Status: ACTIVE | Noted: 2024-06-24

## 2024-06-24 PROBLEM — I31.39 PERICARDIAL EFFUSION: Status: ACTIVE | Noted: 2024-06-24

## 2024-06-24 LAB
CA-I SERPL-SCNC: 1.4 MMOL/L (ref 1.1–1.3)
CRP SERPL HS-MCNC: 14.02 MG/DL (ref 0–0.75)
ERYTHROCYTE [SEDIMENTATION RATE] IN BLOOD BY WESTERGREN METHOD: 84 MM/HOUR (ref 0–25)
HAV IGM SERPL QL IA: NORMAL
HBV CORE IGM SER QL: NORMAL
HBV SURFACE AB SERPL IA-ACNC: 92.4 MIU/ML (ref 0–10)
HBV SURFACE AG SER QL: NORMAL
HCV AB SER QL: NORMAL
LV EJECT FRACT MOD 2C 99903: 62.39
LV EJECT FRACT MOD 4C 99902: 60.29
LV EJECT FRACT MOD BP 99901: 60.71
POTASSIUM SERPL-SCNC: 6.8 MMOL/L (ref 3.6–5.5)
PROCALCITONIN SERPL-MCNC: 2.46 NG/ML
PTH-INTACT SERPL-MCNC: 202 PG/ML (ref 14–72)
TROPONIN T SERPL-MCNC: 212 NG/L (ref 6–19)

## 2024-06-24 PROCEDURE — 71250 CT THORAX DX C-: CPT

## 2024-06-24 PROCEDURE — 90935 HEMODIALYSIS ONE EVALUATION: CPT

## 2024-06-24 PROCEDURE — 96365 THER/PROPH/DIAG IV INF INIT: CPT

## 2024-06-24 PROCEDURE — 99223 1ST HOSP IP/OBS HIGH 75: CPT | Performed by: INTERNAL MEDICINE

## 2024-06-24 PROCEDURE — 700111 HCHG RX REV CODE 636 W/ 250 OVERRIDE (IP): Performed by: INTERNAL MEDICINE

## 2024-06-24 PROCEDURE — 84484 ASSAY OF TROPONIN QUANT: CPT

## 2024-06-24 PROCEDURE — 96376 TX/PRO/DX INJ SAME DRUG ADON: CPT

## 2024-06-24 PROCEDURE — 82652 VIT D 1 25-DIHYDROXY: CPT

## 2024-06-24 PROCEDURE — 700117 HCHG RX CONTRAST REV CODE 255: Performed by: STUDENT IN AN ORGANIZED HEALTH CARE EDUCATION/TRAINING PROGRAM

## 2024-06-24 PROCEDURE — 770020 HCHG ROOM/CARE - TELE (206)

## 2024-06-24 PROCEDURE — 71045 X-RAY EXAM CHEST 1 VIEW: CPT

## 2024-06-24 PROCEDURE — 96372 THER/PROPH/DIAG INJ SC/IM: CPT

## 2024-06-24 PROCEDURE — 36415 COLL VENOUS BLD VENIPUNCTURE: CPT

## 2024-06-24 PROCEDURE — 82330 ASSAY OF CALCIUM: CPT

## 2024-06-24 PROCEDURE — 700111 HCHG RX REV CODE 636 W/ 250 OVERRIDE (IP): Mod: JZ | Performed by: STUDENT IN AN ORGANIZED HEALTH CARE EDUCATION/TRAINING PROGRAM

## 2024-06-24 PROCEDURE — 96375 TX/PRO/DX INJ NEW DRUG ADDON: CPT

## 2024-06-24 PROCEDURE — 93306 TTE W/DOPPLER COMPLETE: CPT

## 2024-06-24 PROCEDURE — 700117 HCHG RX CONTRAST REV CODE 255: Performed by: NURSE PRACTITIONER

## 2024-06-24 PROCEDURE — 84132 ASSAY OF SERUM POTASSIUM: CPT

## 2024-06-24 PROCEDURE — A9270 NON-COVERED ITEM OR SERVICE: HCPCS | Performed by: INTERNAL MEDICINE

## 2024-06-24 PROCEDURE — 80074 ACUTE HEPATITIS PANEL: CPT

## 2024-06-24 PROCEDURE — 700111 HCHG RX REV CODE 636 W/ 250 OVERRIDE (IP): Performed by: NURSE PRACTITIONER

## 2024-06-24 PROCEDURE — 93306 TTE W/DOPPLER COMPLETE: CPT | Mod: 26 | Performed by: INTERNAL MEDICINE

## 2024-06-24 PROCEDURE — 86140 C-REACTIVE PROTEIN: CPT

## 2024-06-24 PROCEDURE — 99254 IP/OBS CNSLTJ NEW/EST MOD 60: CPT | Performed by: INTERNAL MEDICINE

## 2024-06-24 PROCEDURE — 700102 HCHG RX REV CODE 250 W/ 637 OVERRIDE(OP): Performed by: INTERNAL MEDICINE

## 2024-06-24 PROCEDURE — 84145 PROCALCITONIN (PCT): CPT

## 2024-06-24 PROCEDURE — 83970 ASSAY OF PARATHORMONE: CPT

## 2024-06-24 PROCEDURE — 86706 HEP B SURFACE ANTIBODY: CPT

## 2024-06-24 RX ORDER — OMEPRAZOLE 40 MG/1
40 CAPSULE, DELAYED RELEASE ORAL DAILY
COMMUNITY

## 2024-06-24 RX ORDER — AMLODIPINE BESYLATE 10 MG/1
10 TABLET ORAL DAILY
Status: DISCONTINUED | OUTPATIENT
Start: 2024-06-24 | End: 2024-06-26 | Stop reason: HOSPADM

## 2024-06-24 RX ORDER — IBUPROFEN 600 MG/1
600 TABLET ORAL EVERY 8 HOURS
Status: DISCONTINUED | OUTPATIENT
Start: 2024-06-24 | End: 2024-06-26 | Stop reason: HOSPADM

## 2024-06-24 RX ORDER — SODIUM CHLORIDE 9 MG/ML
250 INJECTION, SOLUTION INTRAVENOUS
Status: DISCONTINUED | OUTPATIENT
Start: 2024-06-24 | End: 2024-06-26 | Stop reason: HOSPADM

## 2024-06-24 RX ORDER — PROCHLORPERAZINE EDISYLATE 5 MG/ML
5-10 INJECTION INTRAMUSCULAR; INTRAVENOUS EVERY 4 HOURS PRN
Status: DISCONTINUED | OUTPATIENT
Start: 2024-06-24 | End: 2024-06-26 | Stop reason: HOSPADM

## 2024-06-24 RX ORDER — AZITHROMYCIN 500 MG/1
500 TABLET, FILM COATED ORAL ONCE
Status: ON HOLD | COMMUNITY
Start: 2024-06-16 | End: 2024-06-26

## 2024-06-24 RX ORDER — ACETAMINOPHEN 325 MG/1
650 TABLET ORAL EVERY 6 HOURS PRN
Status: DISCONTINUED | OUTPATIENT
Start: 2024-06-24 | End: 2024-06-26 | Stop reason: HOSPADM

## 2024-06-24 RX ORDER — UBIDECARENONE 75 MG
100 CAPSULE ORAL DAILY
COMMUNITY

## 2024-06-24 RX ORDER — HEPARIN SODIUM 1000 [USP'U]/ML
1250 INJECTION, SOLUTION INTRAVENOUS; SUBCUTANEOUS
Status: DISCONTINUED | OUTPATIENT
Start: 2024-06-24 | End: 2024-06-26 | Stop reason: HOSPADM

## 2024-06-24 RX ORDER — OXYCODONE HYDROCHLORIDE 5 MG/1
2.5 TABLET ORAL
Status: DISCONTINUED | OUTPATIENT
Start: 2024-06-24 | End: 2024-06-26 | Stop reason: HOSPADM

## 2024-06-24 RX ORDER — POLYETHYLENE GLYCOL 3350 17 G/17G
1 POWDER, FOR SOLUTION ORAL
Status: DISCONTINUED | OUTPATIENT
Start: 2024-06-24 | End: 2024-06-26 | Stop reason: HOSPADM

## 2024-06-24 RX ORDER — LOSARTAN POTASSIUM 25 MG/1
25 TABLET ORAL DAILY
Status: ON HOLD | COMMUNITY
Start: 2024-03-19 | End: 2024-06-26

## 2024-06-24 RX ORDER — PROMETHAZINE HYDROCHLORIDE 25 MG/1
12.5-25 TABLET ORAL EVERY 4 HOURS PRN
Status: DISCONTINUED | OUTPATIENT
Start: 2024-06-24 | End: 2024-06-26 | Stop reason: HOSPADM

## 2024-06-24 RX ORDER — CLONIDINE HYDROCHLORIDE 0.1 MG/1
0.1 TABLET ORAL DAILY
Status: DISCONTINUED | OUTPATIENT
Start: 2024-06-24 | End: 2024-06-26 | Stop reason: HOSPADM

## 2024-06-24 RX ORDER — CLONIDINE HYDROCHLORIDE 0.1 MG/1
0.1 TABLET ORAL EVERY MORNING
Status: ON HOLD | COMMUNITY
End: 2024-06-26

## 2024-06-24 RX ORDER — HEPARIN SODIUM 5000 [USP'U]/ML
5000 INJECTION, SOLUTION INTRAVENOUS; SUBCUTANEOUS EVERY 8 HOURS
Status: DISCONTINUED | OUTPATIENT
Start: 2024-06-24 | End: 2024-06-26 | Stop reason: HOSPADM

## 2024-06-24 RX ORDER — AZITHROMYCIN 500 MG/1
500 INJECTION, POWDER, LYOPHILIZED, FOR SOLUTION INTRAVENOUS ONCE
Status: COMPLETED | OUTPATIENT
Start: 2024-06-24 | End: 2024-06-24

## 2024-06-24 RX ORDER — ASPIRIN 81 MG/1
81 TABLET ORAL DAILY
COMMUNITY

## 2024-06-24 RX ORDER — PROMETHAZINE HYDROCHLORIDE 25 MG/1
12.5-25 SUPPOSITORY RECTAL EVERY 4 HOURS PRN
Status: DISCONTINUED | OUTPATIENT
Start: 2024-06-24 | End: 2024-06-26 | Stop reason: HOSPADM

## 2024-06-24 RX ORDER — HEPARIN SODIUM 1000 [USP'U]/ML
500 INJECTION, SOLUTION INTRAVENOUS; SUBCUTANEOUS
Status: DISCONTINUED | OUTPATIENT
Start: 2024-06-24 | End: 2024-06-26 | Stop reason: HOSPADM

## 2024-06-24 RX ORDER — COLCHICINE 0.6 MG/1
1.2 TABLET ORAL 2 TIMES DAILY
Status: DISCONTINUED | OUTPATIENT
Start: 2024-06-24 | End: 2024-06-24

## 2024-06-24 RX ORDER — PREDNISONE 10 MG/1
10 TABLET ORAL DAILY
Status: DISCONTINUED | OUTPATIENT
Start: 2024-06-24 | End: 2024-06-26 | Stop reason: HOSPADM

## 2024-06-24 RX ORDER — PREDNISONE 20 MG/1
20 TABLET ORAL DAILY
Status: DISCONTINUED | OUTPATIENT
Start: 2024-06-24 | End: 2024-06-24

## 2024-06-24 RX ORDER — POLYVINYL ALCOHOL 14 MG/ML
1 SOLUTION/ DROPS OPHTHALMIC PRN
Status: DISCONTINUED | OUTPATIENT
Start: 2024-06-24 | End: 2024-06-26 | Stop reason: HOSPADM

## 2024-06-24 RX ORDER — AMOXICILLIN 250 MG
2 CAPSULE ORAL EVERY EVENING
Status: DISCONTINUED | OUTPATIENT
Start: 2024-06-24 | End: 2024-06-26 | Stop reason: HOSPADM

## 2024-06-24 RX ORDER — SEVELAMER CARBONATE 800 MG/1
800 TABLET, FILM COATED ORAL
Status: DISCONTINUED | OUTPATIENT
Start: 2024-06-24 | End: 2024-06-26 | Stop reason: HOSPADM

## 2024-06-24 RX ORDER — ONDANSETRON 4 MG/1
4 TABLET, ORALLY DISINTEGRATING ORAL EVERY 4 HOURS PRN
Status: DISCONTINUED | OUTPATIENT
Start: 2024-06-24 | End: 2024-06-26 | Stop reason: HOSPADM

## 2024-06-24 RX ORDER — OXYCODONE HYDROCHLORIDE 5 MG/1
5 TABLET ORAL
Status: DISCONTINUED | OUTPATIENT
Start: 2024-06-24 | End: 2024-06-26 | Stop reason: HOSPADM

## 2024-06-24 RX ORDER — ONDANSETRON 2 MG/ML
4 INJECTION INTRAMUSCULAR; INTRAVENOUS EVERY 4 HOURS PRN
Status: DISCONTINUED | OUTPATIENT
Start: 2024-06-24 | End: 2024-06-26 | Stop reason: HOSPADM

## 2024-06-24 RX ORDER — CEFTRIAXONE 2 G/1
2000 INJECTION, POWDER, FOR SOLUTION INTRAMUSCULAR; INTRAVENOUS ONCE
Status: COMPLETED | OUTPATIENT
Start: 2024-06-24 | End: 2024-06-24

## 2024-06-24 RX ORDER — LABETALOL HYDROCHLORIDE 5 MG/ML
10 INJECTION, SOLUTION INTRAVENOUS EVERY 4 HOURS PRN
Status: DISCONTINUED | OUTPATIENT
Start: 2024-06-24 | End: 2024-06-26 | Stop reason: HOSPADM

## 2024-06-24 RX ORDER — ERGOCALCIFEROL 1.25 MG/1
50000 CAPSULE ORAL
Status: DISCONTINUED | OUTPATIENT
Start: 2024-06-24 | End: 2024-06-24

## 2024-06-24 RX ORDER — OMEPRAZOLE 20 MG/1
40 CAPSULE, DELAYED RELEASE ORAL DAILY
Status: DISCONTINUED | OUTPATIENT
Start: 2024-06-24 | End: 2024-06-26 | Stop reason: HOSPADM

## 2024-06-24 RX ORDER — HYDROMORPHONE HYDROCHLORIDE 1 MG/ML
0.25 INJECTION, SOLUTION INTRAMUSCULAR; INTRAVENOUS; SUBCUTANEOUS
Status: DISCONTINUED | OUTPATIENT
Start: 2024-06-24 | End: 2024-06-26 | Stop reason: HOSPADM

## 2024-06-24 RX ORDER — ATORVASTATIN CALCIUM 40 MG/1
40 TABLET, FILM COATED ORAL
COMMUNITY

## 2024-06-24 RX ORDER — AMOXICILLIN AND CLAVULANATE POTASSIUM 875; 125 MG/1; MG/1
1 TABLET, FILM COATED ORAL 2 TIMES DAILY
Status: ON HOLD | COMMUNITY
Start: 2024-06-16 | End: 2024-06-26

## 2024-06-24 RX ORDER — POLYETHYLENE GLYCOL 3350 17 G/17G
17 POWDER, FOR SOLUTION ORAL
COMMUNITY

## 2024-06-24 RX ADMIN — ACETAMINOPHEN 650 MG: 325 TABLET, FILM COATED ORAL at 14:54

## 2024-06-24 RX ADMIN — SEVELAMER CARBONATE 800 MG: 800 TABLET, FILM COATED ORAL at 20:05

## 2024-06-24 RX ADMIN — HEPARIN SODIUM 1300 UNITS: 1000 INJECTION, SOLUTION INTRAVENOUS; SUBCUTANEOUS at 14:39

## 2024-06-24 RX ADMIN — IOHEXOL 66 ML: 350 INJECTION, SOLUTION INTRAVENOUS at 00:00

## 2024-06-24 RX ADMIN — HEPARIN SODIUM 5000 UNITS: 5000 INJECTION, SOLUTION INTRAVENOUS; SUBCUTANEOUS at 21:26

## 2024-06-24 RX ADMIN — HEPARIN SODIUM 5000 UNITS: 5000 INJECTION, SOLUTION INTRAVENOUS; SUBCUTANEOUS at 14:54

## 2024-06-24 RX ADMIN — AZITHROMYCIN 500 MG: 500 INJECTION, POWDER, LYOPHILIZED, FOR SOLUTION INTRAVENOUS at 00:21

## 2024-06-24 RX ADMIN — SENNOSIDES AND DOCUSATE SODIUM 2 TABLET: 50; 8.6 TABLET ORAL at 20:05

## 2024-06-24 RX ADMIN — CLONIDINE HYDROCHLORIDE 0.1 MG: 0.1 TABLET ORAL at 05:11

## 2024-06-24 RX ADMIN — POLYETHYLENE GLYCOL 3350 1 PACKET: 17 POWDER, FOR SOLUTION ORAL at 05:18

## 2024-06-24 RX ADMIN — HEPARIN SODIUM 5000 UNITS: 5000 INJECTION, SOLUTION INTRAVENOUS; SUBCUTANEOUS at 08:30

## 2024-06-24 RX ADMIN — HUMAN ALBUMIN MICROSPHERES AND PERFLUTREN 3 ML: 10; .22 INJECTION, SOLUTION INTRAVENOUS at 09:30

## 2024-06-24 RX ADMIN — CEFTRIAXONE SODIUM 2000 MG: 2 INJECTION, POWDER, FOR SOLUTION INTRAMUSCULAR; INTRAVENOUS at 00:21

## 2024-06-24 RX ADMIN — HEPARIN SODIUM 500 UNITS: 1000 INJECTION, SOLUTION INTRAVENOUS; SUBCUTANEOUS at 14:38

## 2024-06-24 RX ADMIN — SEVELAMER CARBONATE 800 MG: 800 TABLET, FILM COATED ORAL at 08:30

## 2024-06-24 RX ADMIN — AMLODIPINE BESYLATE 10 MG: 5 TABLET ORAL at 05:10

## 2024-06-24 RX ADMIN — HEPARIN SODIUM 5000 UNITS: 5000 INJECTION, SOLUTION INTRAVENOUS; SUBCUTANEOUS at 01:24

## 2024-06-24 RX ADMIN — OMEPRAZOLE 40 MG: 20 CAPSULE, DELAYED RELEASE ORAL at 05:11

## 2024-06-24 RX ADMIN — PREDNISONE 10 MG: 10 TABLET ORAL at 20:05

## 2024-06-24 RX ADMIN — OXYCODONE HYDROCHLORIDE 5 MG: 5 TABLET ORAL at 01:23

## 2024-06-24 RX ADMIN — PREDNISONE 20 MG: 20 TABLET ORAL at 10:51

## 2024-06-24 RX ADMIN — HYDROMORPHONE HYDROCHLORIDE 0.25 MG: 1 INJECTION, SOLUTION INTRAMUSCULAR; INTRAVENOUS; SUBCUTANEOUS at 02:30

## 2024-06-24 RX ADMIN — IBUPROFEN 600 MG: 600 TABLET, FILM COATED ORAL at 20:05

## 2024-06-24 SDOH — ECONOMIC STABILITY: TRANSPORTATION INSECURITY
IN THE PAST 12 MONTHS, HAS THE LACK OF TRANSPORTATION KEPT YOU FROM MEDICAL APPOINTMENTS OR FROM GETTING MEDICATIONS?: NO

## 2024-06-24 SDOH — ECONOMIC STABILITY: TRANSPORTATION INSECURITY
IN THE PAST 12 MONTHS, HAS LACK OF RELIABLE TRANSPORTATION KEPT YOU FROM MEDICAL APPOINTMENTS, MEETINGS, WORK OR FROM GETTING THINGS NEEDED FOR DAILY LIVING?: NO

## 2024-06-24 ASSESSMENT — PATIENT HEALTH QUESTIONNAIRE - PHQ9
2. FEELING DOWN, DEPRESSED, IRRITABLE, OR HOPELESS: NOT AT ALL
1. LITTLE INTEREST OR PLEASURE IN DOING THINGS: NOT AT ALL
SUM OF ALL RESPONSES TO PHQ9 QUESTIONS 1 AND 2: 0

## 2024-06-24 ASSESSMENT — PAIN DESCRIPTION - PAIN TYPE
TYPE: ACUTE PAIN

## 2024-06-24 ASSESSMENT — SOCIAL DETERMINANTS OF HEALTH (SDOH)
WITHIN THE LAST YEAR, HAVE TO BEEN RAPED OR FORCED TO HAVE ANY KIND OF SEXUAL ACTIVITY BY YOUR PARTNER OR EX-PARTNER?: NO
WITHIN THE LAST YEAR, HAVE YOU BEEN AFRAID OF YOUR PARTNER OR EX-PARTNER?: NO
WITHIN THE PAST 12 MONTHS, THE FOOD YOU BOUGHT JUST DIDN'T LAST AND YOU DIDN'T HAVE MONEY TO GET MORE: NEVER TRUE
WITHIN THE LAST YEAR, HAVE YOU BEEN HUMILIATED OR EMOTIONALLY ABUSED IN OTHER WAYS BY YOUR PARTNER OR EX-PARTNER?: NO
WITHIN THE LAST YEAR, HAVE YOU BEEN KICKED, HIT, SLAPPED, OR OTHERWISE PHYSICALLY HURT BY YOUR PARTNER OR EX-PARTNER?: NO
WITHIN THE PAST 12 MONTHS, YOU WORRIED THAT YOUR FOOD WOULD RUN OUT BEFORE YOU GOT THE MONEY TO BUY MORE: NEVER TRUE
IN THE PAST 12 MONTHS, HAS THE ELECTRIC, GAS, OIL, OR WATER COMPANY THREATENED TO SHUT OFF SERVICE IN YOUR HOME?: NO

## 2024-06-24 ASSESSMENT — ENCOUNTER SYMPTOMS
PHOTOPHOBIA: 0
PALPITATIONS: 0
SPEECH CHANGE: 0
ORTHOPNEA: 0
BLURRED VISION: 0
SHORTNESS OF BREATH: 1
HEADACHES: 0
HALLUCINATIONS: 0
TINGLING: 0
WEAKNESS: 1
FEVER: 0
WHEEZING: 0
FLANK PAIN: 0
POLYDIPSIA: 0
DOUBLE VISION: 0
CHILLS: 0
ORTHOPNEA: 1
HEARTBURN: 0
DIARRHEA: 0
BACK PAIN: 0
NECK PAIN: 1
WEIGHT LOSS: 0
COUGH: 0
SPUTUM PRODUCTION: 0
BRUISES/BLEEDS EASILY: 0
VOMITING: 0
NECK PAIN: 0
NAUSEA: 0
BACK PAIN: 1
HEMOPTYSIS: 0
NERVOUS/ANXIOUS: 0
ABDOMINAL PAIN: 0
DIZZINESS: 0
TREMORS: 0
FOCAL WEAKNESS: 0

## 2024-06-24 ASSESSMENT — LIFESTYLE VARIABLES
HAVE YOU EVER FELT YOU SHOULD CUT DOWN ON YOUR DRINKING: NO
EVER FELT BAD OR GUILTY ABOUT YOUR DRINKING: NO
EVER HAD A DRINK FIRST THING IN THE MORNING TO STEADY YOUR NERVES TO GET RID OF A HANGOVER: NO
CONSUMPTION TOTAL: NEGATIVE
SUBSTANCE_ABUSE: 0
AVERAGE NUMBER OF DAYS PER WEEK YOU HAVE A DRINK CONTAINING ALCOHOL: 0
ON A TYPICAL DAY WHEN YOU DRINK ALCOHOL HOW MANY DRINKS DO YOU HAVE: 0
ALCOHOL_USE: NO
HOW MANY TIMES IN THE PAST YEAR HAVE YOU HAD 5 OR MORE DRINKS IN A DAY: 0
TOTAL SCORE: 0
HAVE PEOPLE ANNOYED YOU BY CRITICIZING YOUR DRINKING: NO

## 2024-06-24 ASSESSMENT — FIBROSIS 4 INDEX: FIB4 SCORE: 0.94

## 2024-06-24 NOTE — ED TRIAGE NOTES
Vitals:    06/23/24 2131   BP: 118/59   Pulse: 76   Resp: 18   Temp: 36.5 °C (97.7 °F)   SpO2: 96%     Chief Complaint   Patient presents with    Chest Pain     Pt reports she has had chest pain on and off for about a month. She was hospitalized in [place] for a workup, including stress test, cardiac cath, and echo and found to have a pericardial effusion that didn't require tx. She reports her pain is intermittent. She is MWF dialysis pt, has not missed dialysis.     Pt is ambulatory to and from triage and is alert and oriented x 4.

## 2024-06-24 NOTE — ED NOTES
Francisca has bilateral fistula sites on both upper extremities. Active fistula site is on the left arm and fistula sleeve in place. Right arm is inactive.

## 2024-06-24 NOTE — CONSULTS
Cardiology Initial Consult Note    Reason for Consult:  Asked by Dr Allie Puckett* to see this patient with pericardial effusion  Patient's PCP: Pankaj Solorzano M.D.    CC:   Chief Complaint   Patient presents with    Chest Pain       HPI:   This is a 63-year-old woman with past medical history significant for ESRD on HD Monday Wednesday Friday, hypertension, dyslipidemia, hypothyroidism, known history of pericardial effusion who was initially admitted to the hospital with pleuritic/positional chest pain.    CT imaging performed which showed findings of small bilateral pleural effusion as well as concern for large pericardial effusion.  She was admitted to the medicine service for further management.  She had a repeat echocardiogram performed which demonstrates only moderate pericardial effusion size with largest diameter 1.1 cm.  No evidence of hemodynamic compromise/tamponade.    Of note, she was admitted to [facility] last week with similar symptoms.  She had extensive cardiac workup performed at that facility including stress test with MPI which shows evidence of ischemia.  As such she underwent cardiac catheterization which shows normal coronary arteries with normal LVEDP.  Echocardiogram at that time showed moderate size pericardial effusion without evidence of tamponade.     ID number [id number] used during encounter.      Medications / Drug list prior to admission:  No current facility-administered medications on file prior to encounter.     Current Outpatient Medications on File Prior to Encounter   Medication Sig Dispense Refill    aspirin 81 MG EC tablet Take 81 mg by mouth every day.      amoxicillin-clavulanate (AUGMENTIN) 875-125 MG Tab Take 1 Tablet by mouth 2 times a day. 3 day course started 6/16/24      azithromycin (ZITHROMAX) 500 MG tablet Take 500 mg by mouth one time.      losartan (COZAAR) 25 MG Tab Take 25 mg by mouth every day.      metoprolol tartrate  (LOPRESSOR) 25 MG Tab Take 50 mg by mouth 2 times a day. 50 mg = 2 tablets      cloNIDine (CATAPRES) 0.1 MG Tab Take 0.1 mg by mouth every morning.      omeprazole (PRILOSEC) 40 MG delayed-release capsule Take 40 mg by mouth every day.      polyethylene glycol/lytes (MIRALAX) Pack Take 17 g by mouth 1 time a day as needed.      cyanocobalamin (VITAMIN B-12) 100 MCG Tab Take 100 mcg by mouth every day.      MELATONIN PO Take 1 Tablet by mouth at bedtime as needed.      sevelamer (RENAGEL) 800 MG Tab Take 800 mg by mouth 3 times a day with meals. Indications: takes 3 tabs 3 times a day      atorvastatin (LIPITOR) 40 MG Tab Take 40 mg by mouth at bedtime.      amLODIPine (NORVASC) 10 MG Tab TAKE ONE TABLET BY MOUTH ONE TIME DAILY  (Patient taking differently: Take 10 mg by mouth every day.) 30 Tab 4    acetaminophen (TYLENOL) 325 MG Tab Take 650 mg by mouth every 6 hours as needed for Fever or Mild Pain.         Current list of administered Medications:    Current Facility-Administered Medications:     heparin injection 5,000 Units, 5,000 Units, Subcutaneous, Q8HRS, Adonay Rich M.D., 5,000 Units at 06/24/24 0830    senna-docusate (Pericolace Or Senokot S) 8.6-50 MG per tablet 2 Tablet, 2 Tablet, Oral, Q EVENING **AND** polyethylene glycol/lytes (Miralax) Packet 1 Packet, 1 Packet, Oral, QDAY PRN, Adonay Rich M.D., 1 Packet at 06/24/24 0518    acetaminophen (Tylenol) tablet 650 mg, 650 mg, Oral, Q6HRS PRN, Adonay iRch M.D.    Notify provider if pain remains uncontrolled, , , CONTINUOUS **AND** Use the Numeric Rating Scale (NRS), Phillips-Baker Faces (WBF), or FLACC on regular floors and Critical-Care Pain Observation Tool (CPOT) on ICUs/Trauma to assess pain, , , CONTINUOUS **AND** Pulse Ox, , , CONTINUOUS **AND** Pharmacy Consult Request ...Pain Management Review 1 Each, 1 Each, Other, PHARMACY TO DOSE **AND** If patient difficult to arouse and/or has respiratory depression (respiratory rate of 10 or  less), stop any opiates that are currently infusing and call a Rapid Response., , , CONTINUOUS, Adonay Rich M.D.    oxyCODONE immediate-release (Roxicodone) tablet 2.5 mg, 2.5 mg, Oral, Q3HRS PRN **OR** oxyCODONE immediate-release (Roxicodone) tablet 5 mg, 5 mg, Oral, Q3HRS PRN, 5 mg at 06/24/24 0123 **OR** HYDROmorphone (Dilaudid) injection 0.25 mg, 0.25 mg, Intravenous, Q3HRS PRN, Adonay Rich M.D., 0.25 mg at 06/24/24 0230    labetalol (Normodyne/Trandate) injection 10 mg, 10 mg, Intravenous, Q4HRS PRN, Adonay Rich M.D.    ondansetron (Zofran) syringe/vial injection 4 mg, 4 mg, Intravenous, Q4HRS PRN, Adonay Rich M.D.    ondansetron (Zofran ODT) dispertab 4 mg, 4 mg, Oral, Q4HRS PRN, Adonay Rich M.D.    promethazine (Phenergan) tablet 12.5-25 mg, 12.5-25 mg, Oral, Q4HRS PRN, Adonay Rich M.D.    promethazine (Phenergan) suppository 12.5-25 mg, 12.5-25 mg, Rectal, Q4HRS PRN, Adonay Rich M.D.    prochlorperazine (Compazine) injection 5-10 mg, 5-10 mg, Intravenous, Q4HRS PRN, Adonay Rich M.D.    amLODIPine (Norvasc) tablet 10 mg, 10 mg, Oral, DAILY, Adonay Rich M.D., 10 mg at 06/24/24 0510    artificial tears ophthalmic solution 1 Drop, 1 Drop, Both Eyes, PRN, Adonay Rich M.D.    cloNIDine (Catapres) tablet 0.1 mg, 0.1 mg, Oral, DAILY, Adonay Rich M.D., 0.1 mg at 06/24/24 0511    omeprazole (PriLOSEC) capsule 40 mg, 40 mg, Oral, DAILY, Adonay Rich M.D., 40 mg at 06/24/24 0511    sevelamer carbonate (Renvela) tablet 800 mg, 800 mg, Oral, TID WITH MEALS, Adonay Rich M.D., 800 mg at 06/24/24 0830    predniSONE (Deltasone) tablet 20 mg, 20 mg, Oral, DAILY, PING LightP.RElenaNElena, 20 mg at 06/24/24 1051    Past Medical History:   Diagnosis Date    Anemia 12/08/2022    history of    Anesthesia 12/08/2022    PONV, pt with history of motion sickness    Breath shortness 12/08/2022    with exertion since 2021    Dental disorder  12/08/2022    full plate upper and partial lower    Dialysis patient (HCC) 12/08/2022 monday, Wednesday, Friday    Disorder of thyroid 12/08/2022    following up with MD, not medicate    Essential hypertension 12/08/2022    medicated    Heart burn 12/08/2022    medicated    High cholesterol 12/08/2022    medicated    Indigestion 12/08/2022    medicated    Kidney disease 12/08/2022    end stage    Pain 12/08/2022    left shoulder and neck with tingling and    Pneumonia 12/08/2022    history of    PONV (postoperative nausea and vomiting) 12/08/2022    history of motion sickness    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    Snoring 12/08/2022    Urinary tract infection, site not specified        Past Surgical History:   Procedure Laterality Date    AV FISTULA CREATION Left 12/26/2022    Procedure: LEFT BRACHIOCEPHALIC ARTERIOVENOUS FISTULA CREATION.;  Surgeon: Mauro Saavedra M.D.;  Location: SURGERY [facility];  Service: General    COLONOSCOPY - ENDO  12/20/2016    Procedure: COLONOSCOPY - ENDO;  Surgeon: Fredy Gustafson M.D.;  Location: SURGERY [facility];  Service:     AV FISTULA THROMBOLYSIS         Family History   Problem Relation Age of Onset    Diabetes Father     Lung Disease Sister     No Known Problems Brother      Patient family history was personally reviewed, no pertinent family history to current presentation    Social History     Tobacco Use    Smoking status: Never    Smokeless tobacco: Never   Vaping Use    Vaping status: Never Used   Substance Use Topics    Alcohol use: No     Alcohol/week: 0.0 oz    Drug use: No       ALLERGIES:  No Known Allergies    Review of systems:  A complete review of symptoms was reviewed with the patient. This is reviewed in H&P and PMH. ALL OTHERS reviewed and negative    Physical exam:  Patient Vitals for the past 24 hrs:   BP Temp Temp src Pulse Resp SpO2 Height Weight   06/24/24 0948 102/57 37.5 °C (99.5 °F) Temporal 72 18 90 % -- --  "  24 0830 101/57 -- -- -- -- -- -- --   24 0501 109/55 36.2 °C (97.1 °F) Temporal 72 16 93 % -- --   24 0113 (!) 141/73 36.2 °C (97.1 °F) Temporal 89 16 93 % 1.549 m (5' 1\") 47.9 kg (105 lb 9.6 oz)   24 0102 107/55 -- -- 76 20 97 % -- --   24 0100 -- -- -- 76 14 98 % -- --   24 0030 113/59 -- -- 71 14 98 % -- --   24 0000 104/55 -- -- 71 14 88 % -- --   24 2339 105/54 -- -- -- -- -- -- --   24 2338 -- -- -- 71 14 90 % -- --   24 2303 127/57 -- -- 71 -- 96 % -- --   24 2230 101/55 -- -- 71 19 96 % -- --   247 -- -- -- -- -- -- 1.549 m (5' 1\") 47.7 kg (105 lb 2.6 oz)   24 2131 118/59 36.5 °C (97.7 °F) Temporal 76 18 96 % -- --     General: Not in acute distress, lying comfortably in bed  EYES: No jaundice  HEENT: OP clear   Neck:  No carotid bruits, No JVD appreciated  CVS:  RRR, Normal S1, S2. No murmurs, soft rub  Resp: Normal respiratory effort, lungs CTA bilaterally. No rales or rhonchi  Abdomen: Soft, non-distended, non-tender to palpation, no guarding or rigidity  Skin: No obvious rashes, no cyanosis  Neurological: Alert and oriented x3, moves all extremities, no focal neurologic deficits  Psychiatric: Appropriate affect  Extremities:   Extremities warm, pulses intact, no evidence of lower extremity edema soft wrap      Data:  Laboratory studies personally reviewed by me:  Recent Results (from the past 24 hour(s))   EKG (NOW)    Collection Time: 24  9:51 PM   Result Value Ref Range    Report       [facility] Emergency Dept.    Test Date:  2024  Pt Name:    SERGIO CHAPIN             Department: ER  MRN:        [id number]                      Room:  Gender:     Female                       Technician: [id number]  :        1960                   Requested By:ER TRIAGE PROTOCOL  Order #:    [id number]                    Reading MD: Sadi Carlson    Measurements  Intervals                         "        Axis  Rate:       71                           P:          9  NM:         151                          QRS:        22  QRSD:       81                           T:          78  QT:         380  QTc:        413    Interpretive Statements  Interpreted by me: Sinus rhythm, rate 71, normal intervals, no signs of acute  ischemia when compared to prior  Electronically Signed On 06- 22:23:52 PDT by Sadi Carlson     CBC with Differential    Collection Time: 06/23/24 10:20 PM   Result Value Ref Range    WBC 12.9 (H) 4.8 - 10.8 K/uL    RBC 3.26 (L) 4.20 - 5.40 M/uL    Hemoglobin 10.5 (L) 12.0 - 16.0 g/dL    Hematocrit 34.1 (L) 37.0 - 47.0 %    .6 (H) 81.4 - 97.8 fL    MCH 32.2 27.0 - 33.0 pg    MCHC 30.8 (L) 32.2 - 35.5 g/dL    RDW 61.0 (H) 35.9 - 50.0 fL    Platelet Count 351 164 - 446 K/uL    MPV 9.4 9.0 - 12.9 fL    Neutrophils-Polys 78.20 (H) 44.00 - 72.00 %    Lymphocytes 8.90 (L) 22.00 - 41.00 %    Monocytes 11.50 0.00 - 13.40 %    Eosinophils 0.30 0.00 - 6.90 %    Basophils 0.60 0.00 - 1.80 %    Immature Granulocytes 0.50 0.00 - 0.90 %    Nucleated RBC 0.00 0.00 - 0.20 /100 WBC    Neutrophils (Absolute) 10.06 (H) 1.82 - 7.42 K/uL    Lymphs (Absolute) 1.14 1.00 - 4.80 K/uL    Monos (Absolute) 1.48 (H) 0.00 - 0.85 K/uL    Eos (Absolute) 0.04 0.00 - 0.51 K/uL    Baso (Absolute) 0.08 0.00 - 0.12 K/uL    Immature Granulocytes (abs) 0.07 0.00 - 0.11 K/uL    NRBC (Absolute) 0.00 K/uL   Complete Metabolic Panel (CMP)    Collection Time: 06/23/24 10:20 PM   Result Value Ref Range    Sodium 134 (L) 135 - 145 mmol/L    Potassium 5.5 3.6 - 5.5 mmol/L    Chloride 93 (L) 96 - 112 mmol/L    Co2 21 20 - 33 mmol/L    Anion Gap 20.0 (H) 7.0 - 16.0    Glucose 118 (H) 65 - 99 mg/dL    Bun 58 (H) 8 - 22 mg/dL    Creatinine 9.54 (HH) 0.50 - 1.40 mg/dL    Calcium 11.5 (H) 8.5 - 10.5 mg/dL    Correct Calcium 11.6 (H) 8.5 - 10.5 mg/dL    AST(SGOT) 18 12 - 45 U/L    ALT(SGPT) 11 2 - 50 U/L    Alkaline Phosphatase 112  (H) 30 - 99 U/L    Total Bilirubin 0.5 0.1 - 1.5 mg/dL    Albumin 3.9 3.2 - 4.9 g/dL    Total Protein 7.0 6.0 - 8.2 g/dL    Globulin 3.1 1.9 - 3.5 g/dL    A-G Ratio 1.3 g/dL   Troponins NOW    Collection Time: 06/23/24 10:20 PM   Result Value Ref Range    Troponin T 224 (H) 6 - 19 ng/L   ESTIMATED GFR    Collection Time: 06/23/24 10:20 PM   Result Value Ref Range    GFR (CKD-EPI) 4 (A) >60 mL/min/1.73 m 2   Sed Rate    Collection Time: 06/23/24 10:20 PM   Result Value Ref Range    Sed Rate Westergren 84 (H) 0 - 25 mm/hour   Troponins in two (2) hours    Collection Time: 06/24/24 12:28 AM   Result Value Ref Range    Troponin T 212 (H) 6 - 19 ng/L   CRP QUANTITIVE (NON-CARDIAC)    Collection Time: 06/24/24 12:28 AM   Result Value Ref Range    Stat C-Reactive Protein 14.02 (H) 0.00 - 0.75 mg/dL   PROCALCITONIN    Collection Time: 06/24/24 12:28 AM   Result Value Ref Range    Procalcitonin 2.46 (H) <0.25 ng/mL   PTH WITH IONIZED CALCIUM    Collection Time: 06/24/24  5:51 AM   Result Value Ref Range    Pth, Intact 202.0 (H) 14.0 - 72.0 pg/mL    Ionized Calcium 1.4 (H) 1.1 - 1.3 mmol/L   EC-ECHOCARDIOGRAM COMPLETE W/ CONT    Collection Time: 06/24/24  8:51 AM   Result Value Ref Range    Eject.Frac. MOD BP 60.71     Eject.Frac. MOD 4C 60.29     Eject.Frac. MOD 2C 62.39        Imaging:  CT-CHEST (THORAX) W/O   Final Result      1.  There is a large pericardial effusion which now demonstrates increased density suggesting debris or blood products which is new since the prior study of yesterday.   2.  Increasing bilateral airspace disease left greater than right could represent pneumonitis or atelectasis.   3.  Trace amount of dependent pleural effusion.   4.  New dilated thoracic esophagus containing internal debris, fluid and air with no gross evidence of mediastinal air.      Fleischner Society pulmonary nodule recommendations:   Not Applicable         EC-ECHOCARDIOGRAM COMPLETE W/ CONT   Final Result      DX-CHEST-PORTABLE  (1 VIEW)   Final Result      Enlargement of the cardiac silhouette is consistent with a pericardial effusion.      DX-SHOULDER 2+ LEFT   Final Result      No radiographic evidence of acute traumatic injury.      CT-CTA CHEST PULMONARY ARTERY W/ RECONS   Final Result         1. There are small bilateral pleural effusions, left larger than right.   2. There is compressive atelectasis and consolidation of the lower lobes, left worse than right.   3. Pericardial effusion.   4. No evidence for pulmonary embolism.   5. Atherosclerosis including coronary artery disease.                    EKG tracings personally reviewed by me sinus rhythm, no ischemia    Cardiac Cath Tanmay Hollis 6/20/2024:  Coronary Findings Diagnostic Dominance: Right   Left Main: The vessel was visualized by angiography, is moderate in size and is angiographically normal.   Left Anterior Descending: The vessel was visualized by angiography, is moderate in size and is angiographically normal.   Left Circumflex: The vessel was visualized by angiography, is moderate in size and is angiographically normal.   Right Coronary Artery: The vessel was visualized by angiography, is moderate in size and is angiographically normal.     Echo Tanmay Hollis 6/19/2024:  Interpretation Summary   Conclusion     Moderate size pericardial effusion noted posteriorly. RAP is 3 mm Hg. There is minimal increase in   the size of the effusion with no evidence of cardiac tamponade noted when compared with previous   echo.     Normal LV size and function with EF of 60-65 %.   Dilated RV size and systolic function.       Echo 6/24/2024:  Normal left ventricular chamber size. Normal left ventricular wall   thickness. Normal left ventricular systolic function. The left   ventricular ejection fraction is visually estimated to be 60-65%.   Normal regional wall motion. Grade II diastolic dysfunction.  Severely dilated left atrium  Aortic valve sclerosis without significant stenosis  Mild  tricuspid regurgitation with estimated RV systolic pressure of 41 mmHg.  Moderate pericardial effusion without evidence of hemodynamic left pleural effusion present.      All pertinent features of laboratory and imaging reviewed including primary images where applicable      Principal Problem:    Chest pain (POA: Yes)  Active Problems:    Hypertension (POA: Yes)    Pericardial effusion (POA: Unknown)    Pneumonia (POA: Unknown)  Resolved Problems:    * No resolved hospital problems. *      Assessment / Plan:  Acute pericarditis  Pericardial effusion--no evidence of cardiac tamponade  Hypertension  Dyslipidemia  ESRD on HD  No evidence of coronary artery disease based on cardiac cath performed on 6/20/2024 at [facility].    Recommendations:  Clinical presentation consistent with acute pericarditis.  She does have findings of moderate size pericardial effusion with largest diameter of 1.1 cm.  Personal review of echocardiogram shows no evidence of tamponade.  No evidence of RV diastolic collapse.  IVC size is normal  No concern for acute coronary syndrome given recent cardiac cath which shows normal epicardial coronary arteries.  Recommend initiation of anti-inflammatory therapies.  Given ESRD status, unable to use colchicine.  Although not first-line, reasonable to use prednisone for symptom relief.  Check repeat echo as an outpatient in 1 month to evaluate for resolution/progression in size.  Continue hemodialysis for volume optimization which may help effusion size.  Cardiology will continue to follow      I personally discussed her case with  Dr Allie Puckett*    No future appointments.    It is my pleasure to participate in the care of Ms. Milton Villagran.  Please do not hesitate to contact me with questions or concerns.    Michel Jean MD  Cardiologist, [facility]    6/24/2024    Please note that this dictation was created using voice recognition software. I  have made every reasonable attempt to correct obvious errors, but it is possible there are errors of grammar and possibly content that I did not discover before finalizing the note.

## 2024-06-24 NOTE — PROGRESS NOTES
Daily Progress Note    Date of Service  6/24/2024    Chief Complaint  Yasemin Villagran is a 63 y.o. female admitted 6/23/2024 with chest pain radiating to her left arm, left-side of the neck, and back.     Hospital Course  Yasemin Villagran is a 63 y.o. 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 Patient states she has been to [facility] multiple times and has not felt any improvement after each discharge. Complaints of orthopnea, left sided chest pressure radiating to the left arm, neck, and back worse with inspiration.     In the ED patient found to have elevated WBC of 12.9, sodium of 134, BUN 58, Cr 9.54, GFR 4, corrected calcium 11.6, Troponin trending down at 212. CTA of the chest with contrast identified small bilateral pleural effusions with left larger than right, compressive atelectasis and consolidation of the lower lobes with left worse than right, and pericardial effusion.at [facility] patient had a normal left heart catheterization.      CT of the chest (thorax) showed a new large pericardial effusion with increased density debris or blood products. Plan to consult cardiology for evaluation of large pericardial effusion. Plan to consult nephrology for management of HD. Continuous telemetry monitoring, continue HD treatment, cardiology consult for large pericardial effusion, start ABX therapy for possible pneumonitis on imaging.              Interval Problem Update  6/24/24: consult cardiology for noted large pericardial effusion and neprology for continuation of HD treatment.     I have discussed this patient's plan of care and discharge plan at IDT rounds today with Case Management, Nursing, Nursing leadership, and other members of the IDT  team.    Consultants/Specialty  cardiology and nephrology    Code Status  Full Code    Disposition  Admit patient to inpatient for management of large pericardial effusion, HD, and penumonitis   I have placed the appropriate orders for post-discharge needs.    Review of Systems  Review of Systems   Constitutional:  Positive for malaise/fatigue. Negative for chills, fever and weight loss.   Respiratory:  Positive for shortness of breath. Negative for cough and wheezing.    Cardiovascular:  Positive for chest pain and orthopnea. Negative for palpitations and leg swelling.   Gastrointestinal:  Negative for abdominal pain, diarrhea, nausea and vomiting.   Musculoskeletal:  Positive for back pain and neck pain.   Neurological:  Positive for weakness. Negative for dizziness, tingling and headaches.        Physical Exam  Temp:  [36.2 °C (97.1 °F)-37.5 °C (99.5 °F)] 37.5 °C (99.5 °F)  Pulse:  [71-89] 72  Resp:  [14-20] 18  BP: (101-141)/(54-73) 102/57  SpO2:  [88 %-98 %] 90 %    Physical Exam  Eyes:      Pupils: Pupils are equal, round, and reactive to light.   Neck:      Vascular: No carotid bruit.   Cardiovascular:      Rate and Rhythm: Regular rhythm.      Heart sounds: Murmur heard.      Friction rub present. No gallop.   Pulmonary:      Breath sounds: No wheezing.   Musculoskeletal:      Cervical back: Normal range of motion.      Right lower leg: No edema.      Left lower leg: No edema.   Neurological:      Mental Status: She is alert.         Fluids    Intake/Output Summary (Last 24 hours) at 6/24/2024 1055  Last data filed at 6/24/2024 0500  Gross per 24 hour   Intake 120 ml   Output --   Net 120 ml       Laboratory  Recent Labs     06/23/24  2220   WBC 12.9*   RBC 3.26*   HEMOGLOBIN 10.5*   HEMATOCRIT 34.1*   .6*   MCH 32.2   MCHC 30.8*   RDW 61.0*   PLATELETCT 351   MPV 9.4     Recent Labs     06/23/24  2220   SODIUM 134*   POTASSIUM 5.5   CHLORIDE 93*   CO2 21   GLUCOSE 118*   BUN 58*   CREATININE 9.54*    CALCIUM 11.5*                   Imaging  CT-CHEST (THORAX) W/O   Final Result      1.  There is a large pericardial effusion which now demonstrates increased density suggesting debris or blood products which is new since the prior study of yesterday.   2.  Increasing bilateral airspace disease left greater than right could represent pneumonitis or atelectasis.   3.  Trace amount of dependent pleural effusion.   4.  New dilated thoracic esophagus containing internal debris, fluid and air with no gross evidence of mediastinal air.      Fleischner Society pulmonary nodule recommendations:   Not Applicable         EC-ECHOCARDIOGRAM COMPLETE W/ CONT   Final Result      DX-CHEST-PORTABLE (1 VIEW)   Final Result      Enlargement of the cardiac silhouette is consistent with a pericardial effusion.      DX-SHOULDER 2+ LEFT   Final Result      No radiographic evidence of acute traumatic injury.      CT-CTA CHEST PULMONARY ARTERY W/ RECONS   Final Result         1. There are small bilateral pleural effusions, left larger than right.   2. There is compressive atelectasis and consolidation of the lower lobes, left worse than right.   3. Pericardial effusion.   4. No evidence for pulmonary embolism.   5. Atherosclerosis including coronary artery disease.                 Assessment/Plan  Large pericardial effusion  Assessment & Plan   CT chest (thorax) large pericardial effusion    CTA chest pumonary: pericardila effusion   CXR: enlargement of the cardiac silhoutte consistent with pericardial effusion    Chest pain  Assessment & Plan   Pleuritic chest pain   EKG SR   Echocardiogram: severely dilated left atrium, mild tricuspid regurgitation, moderate pericardial effusion without evidence of hemodynamic compromise  Troponin 212  C at [facility] identified no cardiac abnormalities    Aspirin 81 mg tab    ESRD  Assessment & Plan   Nephrology consult for management of HD   Continue home Sevelamer 800 mg tab    Dialysis  M/W/F    Pneumonia   Assessment & Plan  WBC 12.9  CT chest (thorax) increasing bilateral airspace disease left greater than right, possible pneumonitis or atelectasis  Amoxillin-claculanate 875-125 mg tab   Azithromycin 500 mg tab     Hypertension  Assessment & Plan   Continue Metoprolol tartrate 25 mg tab  Resume home Amlodipine 10 mg tablet  Continue home Losartan 25 mg tab  Continue home Clonidine 0.1 mg tab   Monitor blood pressure     Hyperlipidemia   Assessment & Plan   Continue home Atorvastatin 40 mg tablet         VTE prophylaxis:    heparin ppx      I have performed a physical exam and reviewed and updated ROS and Plan today (6/24/2024). In review of yesterday's note (6/23/2024), there are no changes except as documented above.

## 2024-06-24 NOTE — PROGRESS NOTES
Monitor Summary:  Rhythm: SR  Rate: 69-70  Ectopy: None    0.15/0.08/0.37    Per monitor tech interpretation

## 2024-06-24 NOTE — PROGRESS NOTES
Hospital medicine progress note after midnight:    Yasemin Villagran is a 63 y.o. female past medical history of end-stage renal disease on hemodialysis Monday Wednesday Friday, hypothyroidism, hypertension, dyslipidemia, pericardial effusion without tamponade, who presented 6/23/2024 with complaints of worsening of left sided chest pain, worsening with change in position, deep inspiration, radiating into the left shoulder into the neck.    Patient was admitted at [facility] 6/18 to 6/21 with atypical chest pain started a day prior, associated with shortness of breath, nausea and vomiting and some abdominal pain,, false positive stress test with normal left heart catheterization, pericardial effusion with moderate degree without tamponade, that was not drained.  Per cardiology, recommended to continue hemodialysis for the pericardial effusion.    Upon evaluation in ER, she is hemodynamically stable on room air.  Blood work showed troponin 224, 212, elevated procalcitonin 2.46, hypercalcemia with corrected calcium 11.6, WBC 12.9.  X-ray of the left shoulder showed no acute abnormalities.  Chest x-ray: Enlargement of cardiac silhouette.  CT of the chest: Small bilateral pleural effusion, compressive atelectasis and consolidation of the lower lobe, left worse than the right, large pericardial effusion.    In ER, EKG  noted no significant changes.  Notable lab findings include WBC 12.9, RBC 3.26, hemoglobin 10.5, hematocrit 34.1, sed rate 84, sodium 134, chloride 93, anion gap 20, glucose 118, BUN 58, creatinine 9.54, corrected calcium 11.6, alk phos 112. Per ERP, it is felt that patient needs to be admitted with what appears to be enlarging in size pericardial effusion, repeat echo and consider pericardiocentesis.  Patient admitted to hospital medicine for management of care.    During this admission, patient continues to have chest pain radiating to her left arm and towards the back of her left neck.   CT chest was pursued which noted large pericardial effusion which is increased in density suggesting debris or blood products which is new prior to study yesterday, increasing bilateral airspace disease left greater than right likely representative of pneumonitis or atelectasis, trace amount of dependent pleural effusion, new dilated thoracic esophagus containing internal debris fluid and air with no gross evidence of mediastinal air.    Nephrology Dr. Raphael was consulted for continuation of HD treatment during this hospitalization.  Cardiology Dr. Jean due  noted large pericardial effusion.    Plan of care: Continue telemetry monitoring; continue HD treatment with nephrology, Dr. Raphael consulted; consulted cardiology due to noted large amount of pericardial effusion; will start patient on azithromycin due to noted possible pneumonitis on CT chest.  UNABLE to start NSAIDs for pericardial effusion and pericarditis due to ESRD --> will start with steroids to help with inflammation    Disposition: Patient will be switched to inpatient status as she is anticipated to stay 2-3 midnights for management of large pericardial effusion, need for dialysis, and possible pneumonia.      Problem list:   Chest pain  ESRD on HD MWF  Large pericardial effusion  Pneumonitis  Hypertension  Hypothyroidism   dyslipidemia      Please note that this dictation was created using voice recognition software. I have made every reasonable attempt to correct obvious errors, but there may be errors of grammar and possibly content that I did not discover before finalizing the note.    Electronically signed by:  Dr. JESSE Pryor, DNP, APRN, FNP-C  Hospitalist Services  [facility]  [phone]  [email or web address]  06/24/24                 [id number]

## 2024-06-24 NOTE — ED NOTES
Yonis from Lab called with critical result of trop at 224. Critical lab result read back to Yonis.   Dr. Carlson notified of critical lab result at 9395.  Critical lab result read back by Dr. Ferreira.

## 2024-06-24 NOTE — ASSESSMENT & PLAN NOTE
CT of the chest (thorax) showed a new large pericardial effusion with increased density debris or blood products.   Cardiology consulted,   subsequent  Echo showed moderate pericardial effusion without evidence of hemodynamic compromise;   EF of 60%, RVSP 41.  no interventions indicated.

## 2024-06-24 NOTE — H&P
Hospital Medicine History & Physical Note    Date of Service  6/24/2024    Primary Care Physician  Pankaj Solorzano M.D.      Code Status  Full Code    Chief Complaint  Chief Complaint   Patient presents with    Chest Pain       History of Presenting Illness  Yasemin Villagran is a 63 y.o. female past medical history of end-stage renal disease on hemodialysis Monday Wednesday Friday, hypothyroidism, hypertension, dyslipidemia, pericardial effusion without tamponade, who presented 6/23/2024 with complaints of worsening of left sided chest pain, worsening with change in position, deep inspiration, radiating into the left shoulder into the neck.  Does not result patient was admitted at [facility] 6/18 to 6/21 with atypical chest pain started a day prior, associated with shortness of breath, nausea and vomiting and some abdominal pain,, false positive stress test with normal left heart catheterization, pericardial effusion with moderate degree without tamponade, that was not drained.  Per cardiology, recommended to continue hemodialysis for the pericardial effusion.  Upon evaluation in ER, she is hemodynamically stable on room air.  Blood work showed troponin 224, 212, elevated procalcitonin 2.46, hypercalcemia with corrected calcium 11.6, WBC 12.9.  X-ray of the left shoulder showed no acute abnormalities.  Chest x-ray: Enlargement of cardiac silhouette.  CT of the chest: Small bilateral pleural effusion, compressive atelectasis and consolidation of the lower lobe, left worse than the right, large pericardial effusion.  EKG reviewed and there is no significant changes.  Per ERP, it is felt that patient needs to be admitted with what appears to be enlarging in size pericardial effusion, repeat echo and consider pericardiocentesis.    I discussed the plan of care with patient and ERP .    Review of Systems  Review of Systems   Constitutional:  Negative for chills, fever and weight loss.   HENT:   Negative for ear pain, hearing loss and tinnitus.    Eyes:  Negative for blurred vision, double vision and photophobia.   Respiratory:  Positive for shortness of breath. Negative for cough, hemoptysis and sputum production.    Cardiovascular:  Positive for chest pain. Negative for palpitations and orthopnea.   Gastrointestinal:  Negative for heartburn, nausea and vomiting.   Genitourinary:  Negative for dysuria, flank pain, frequency and hematuria.   Musculoskeletal:  Positive for joint pain. Negative for back pain and neck pain.   Skin:  Negative for itching and rash.   Neurological:  Negative for tremors, speech change, focal weakness and headaches.   Endo/Heme/Allergies:  Negative for environmental allergies and polydipsia. Does not bruise/bleed easily.   Psychiatric/Behavioral:  Negative for hallucinations and substance abuse. The patient is not nervous/anxious.        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    Surgical History   has a past surgical history that includes av fistula thrombolysis; colonoscopy - endo (12/20/2016); and av fistula creation (Left, 12/26/2022).     Family History  family history includes Diabetes in her father; Lung Disease in her sister; No Known Problems in her brother.   Family history reviewed with patient. There is no family history that is pertinent to the chief complaint.     Social History   reports that she has never  smoked. She has never used smokeless tobacco. She reports that she does not drink alcohol and does not use drugs.    Allergies  No Known Allergies    Medications  Prior to Admission Medications   Prescriptions Last Dose Informant Patient Reported? Taking?   B Complex-C-Folic Acid (RENAL VITAMIN PO)  Patient Yes No   Sig: Take 1 Tablet by mouth every day.   Carboxymethylcellulose Sodium (ARTIFICIAL TEARS OP)  Patient Yes No   Sig: Administer  into affected eye(s) as needed.   acetaminophen (TYLENOL) 325 MG Tab  Patient Yes No   Sig: Take 650 mg by mouth every 6 hours as needed for Fever or Mild Pain.   amLODIPine (NORVASC) 10 MG Tab  Patient No No   Sig: TAKE ONE TABLET BY MOUTH ONE TIME DAILY    Patient taking differently: Take 10 mg by mouth every day.   cloNIDine (CATAPRES) 0.1 MG Tab  Patient No No   Sig:  Take 2 tablets by mouth in the morning and 2 tablets in the evening.   Patient taking differently: Take 0.1 mg by mouth 2 times a day.  Take 2 tablets by mouth in the morning and 2 tablets in the evening.   pantoprazole (PROTONIX) 40 MG Tablet Delayed Response  Patient Yes No   Sig: Take 40 mg by mouth every day.   sevelamer (RENAGEL) 800 MG Tab  Patient Yes No   Sig: Take 800 mg by mouth 4 times a day. With meals  Indications: takes 3 tabs 3 times a day   vitamin D, Ergocalciferol, (DRISDOL) 54426 UNIT CAPS capsule  Patient Yes No   Sig: Take 50,000 Units by mouth every 7 days.      Facility-Administered Medications: None       Physical Exam  Temp:  [36.5 °C (97.7 °F)] 36.5 °C (97.7 °F)  Pulse:  [71-76] 71  Resp:  [14-19] 14  BP: (101-127)/(54-59) 105/54  SpO2:  [90 %-96 %] 90 %  Blood Pressure: 105/54   Temperature: 36.5 °C (97.7 °F)   Pulse: 71   Respiration: 14   Pulse Oximetry: 90 %       Physical Exam  Vitals and nursing note reviewed.   Constitutional:       General: She is not in acute distress.     Appearance: Normal appearance.   HENT:      Head: Normocephalic and atraumatic.      Nose: Nose normal.  "     Mouth/Throat:      Mouth: Mucous membranes are moist.   Eyes:      Extraocular Movements: Extraocular movements intact.      Pupils: Pupils are equal, round, and reactive to light.   Cardiovascular:      Rate and Rhythm: Normal rate and regular rhythm.   Pulmonary:      Effort: Pulmonary effort is normal.      Breath sounds: Normal breath sounds.   Abdominal:      General: Abdomen is flat. There is no distension.      Tenderness: There is no abdominal tenderness.   Musculoskeletal:         General: No swelling or deformity. Normal range of motion.      Cervical back: Normal range of motion and neck supple.      Comments: AV fistula on the right forearm with breath   Skin:     General: Skin is warm and dry.   Neurological:      General: No focal deficit present.      Mental Status: She is alert and oriented to person, place, and time.   Psychiatric:         Mood and Affect: Mood normal.         Behavior: Behavior normal.         Laboratory:  Recent Labs     06/23/24  2220   WBC 12.9*   RBC 3.26*   HEMOGLOBIN 10.5*   HEMATOCRIT 34.1*   .6*   MCH 32.2   MCHC 30.8*   RDW 61.0*   PLATELETCT 351   MPV 9.4     Recent Labs     06/23/24  2220   SODIUM 134*   POTASSIUM 5.5   CHLORIDE 93*   CO2 21   GLUCOSE 118*   BUN 58*   CREATININE 9.54*   CALCIUM 11.5*     Recent Labs     06/23/24  2220   ALTSGPT 11   ASTSGOT 18   ALKPHOSPHAT 112*   TBILIRUBIN 0.5   GLUCOSE 118*         No results for input(s): \"NTPROBNP\" in the last 72 hours.      Recent Labs     06/23/24  2220   TROPONINT 224*       Imaging:  DX-CHEST-PORTABLE (1 VIEW)   Final Result      Enlargement of the cardiac silhouette is consistent with a pericardial effusion.      DX-SHOULDER 2+ LEFT   Final Result      No radiographic evidence of acute traumatic injury.      CT-CTA CHEST PULMONARY ARTERY W/ RECONS   Final Result         1. There are small bilateral pleural effusions, left larger than right.   2. There is compressive atelectasis and consolidation of " the lower lobes, left worse than right.   3. Pericardial effusion.   4. No evidence for pulmonary embolism.   5. Atherosclerosis including coronary artery disease.            EC-ECHOCARDIOGRAM COMPLETE W/O CONT    (Results Pending)       X-Ray:  I have personally reviewed the images and compared with prior images.    Assessment/Plan:  Justification for Admission Status  I anticipate this patient is appropriate for observation status at this time because chest pain    Patient will need a Telemetry bed on MEDICAL service .  The need is secondary to chest pain.    * Chest pain- (present on admission)  Assessment & Plan  Per description, patient presented with atypical and pleuritic chest pain  On CTA of the chest there is large pericardial effusion.  Patient is hemodynamically stable, no concern for tamponade.  Plan to repeat echo and consult cardiology for possible pericardiocentesis  Troponin is elevated, but appears to be flat in 200s range, while EKG does not show STEMI  Per chart review troponin has been mildly elevated at [facility]  well  CT of the chest showed possible consolidation in the left and right bases and in the light of elevated WBC we will cover with empiric antibiotics for possible underlying pneumonia    Pneumonia  Assessment & Plan  Presented with pleuritic chest pain, bibasilar consolidation on CT of the chest, elevated procalcitonin and WBC 12.9  Plan to treat with ceftriaxone 5 days total and azithromycin for possible community-acquired pneumonia  Pulse ox, supplemental oxygen as needed    Pericardial effusion  Assessment & Plan  Probably uremic , Although patient has been compliant with hemodialysis per report  CT of the chest now shows large pericardial effusion, repeat echo is pending      Hypertension- (present on admission)  Assessment & Plan  Resume amlodipine  Monitor blood pressure        VTE prophylaxis: heparin ppx

## 2024-06-24 NOTE — CONSULTS
"[facility] -  CONSULTATION NOTE               Author: Manjinder Raphael M.D. Date & Time: 6/24/2024  4:00 PM       REASON FOR CONSULTATION:   - Inpatient hemodialysis management.    CHIEF COMPLAINT:   -  \" chest pain \"    HISTORY OF PRESENT ILLNESS:    64 yo with known history of ESRD on HD at [facility], known pericardial effusion hypothyroidism, hypertension, dyslipidemia, who presented to ER on 6/23/2024 with complaints of worsening of left sided chest pain, worsening with change in position, deep inspiration, radiating into the left shoulder into the neck.      She was recently admitted to [facility] last week with similar symptoms.  She had extensive cardiac workup performed at that facility including stress test with evidence of ischemia. She underwent cardiac catheterization which showed normal coronary arteries with normal LVEDP.  Echocardiogram at that time showed moderate size pericardial effusion without evidence of tamponade. Results summarized below:   Per cardiology, recommended to continue hemodialysis for the pericardial effusion.   Cardiac Cath [facility] 6/20/2024:  Coronary Findings Diagnostic Dominance: Right   Left Main: The vessel was visualized by angiography, is moderate in size and is angiographically normal.   Left Anterior Descending: The vessel was visualized by angiography, is moderate in size and is angiographically normal.   Left Circumflex: The vessel was visualized by angiography, is moderate in size and is angiographically normal.   Right Coronary Artery: The vessel was visualized by angiography, is moderate in size and is angiographically normal.      Echo [facility] 6/19/2024:  Moderate size pericardial effusion noted posteriorly. RAP is 3 mm Hg. There is minimal increase in the size of the effusion with no evidence of cardiac tamponade noted when compared with previous echo. Normal LV size and function with EF of 60-65 %.   Dilated " RV size and systolic function.      She experienced chest pain again yesterday and decided to come to this ER.   Upon evaluation in ER, she is hemodynamically stable on room air. Blood work showed troponin 224, 212, elevated procalcitonin 2.46, hypercalcemia with corrected calcium 11.6, WBC 12.9. X-ray of the left shoulder showed no acute abnormalities. Chest x-ray: Enlargement of cardiac silhouette. CT of the chest: Small bilateral pleural effusion, compressive atelectasis and consolidation of the lower lobe, left worse than the right, large pericardial effusion. Patient denies any fever or chills, no nausea, has some shortness of breath but no cough or phlegm. No melena, hematochezia, hematemesis.  No HA, visual changes, or abdominal pain.    REVIEW OF SYSTEMS:    10 point ROS was performed and is as per HPI or otherwise negative    PAST MEDICAL HISTORY:     Past Medical History   has a past medical history of Anemia (12/08/2022), Anesthesia (12/08/2022), Breath shortness (12/08/2022), Dental disorder (12/08/2022), Dialysis patient (HCC) (12/08/2022), Disorder of thyroid (12/08/2022), Essential hypertension (12/08/2022), Heart burn (12/08/2022), High cholesterol (12/08/2022), Indigestion (12/08/2022), Kidney disease (12/08/2022), Pain (12/08/2022), Pneumonia (12/08/2022), PONV (postoperative nausea and vomiting) (12/08/2022), Farson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) (10/03/2017), Snoring (12/08/2022), and Urinary tract infection, site not specified.     Surgical History   has a past surgical history that includes av fistula thrombolysis; colonoscopy - endo (12/20/2016); and av fistula creation (Left, 12/26/2022).      Family History  family history includes Diabetes in her father; Lung Disease in her sister; No Known Problems in her brother.   Family history reviewed with patient. There is no family history that is pertinent to the chief complaint.      Social History   reports that she has never smoked.  "She has never used smokeless tobacco. She reports that she does not drink alcohol and does not use drugs.  HOME MEDICATIONS:   - Reviewed and documented in chart    LABORATORY STUDIES:   - Reviewed and documented in chart    ALLERGIES:  Patient has no known allergies.    VS:  /63 Comment: HD in progress  Pulse 74   Temp 36.5 °C (97.7 °F)   Resp 18   Ht 1.549 m (5' 1\")   Wt 47.9 kg (105 lb 9.6 oz)   SpO2 95%   BMI 19.95 kg/m²   Physical Exam  Constitutional:       Appearance: Normal appearance.   HENT:      Head: Normocephalic and atraumatic.      Right Ear: External ear normal.      Left Ear: External ear normal.      Nose: Nose normal.      Mouth/Throat:      Mouth: Mucous membranes are dry.   Eyes:      Extraocular Movements: Extraocular movements intact.      Pupils: Pupils are equal, round, and reactive to light.   Cardiovascular:      Rate and Rhythm: Normal rate.      Pulses: Normal pulses.   Pulmonary:      Effort: Pulmonary effort is normal. No respiratory distress.      Breath sounds: No stridor.   Abdominal:      Palpations: Abdomen is soft.   Musculoskeletal:      Cervical back: Neck supple.   Neurological:      Mental Status: She is alert.            FLUID BALANCE:  In: 120 [P.O.:120]  Out: -     LABS:  Recent Labs     06/23/24  2220 06/24/24  1254   SODIUM 134*  --    POTASSIUM 5.5 6.8*   CHLORIDE 93*  --    CO2 21  --    GLUCOSE 118*  --    BUN 58*  --    CREATININE 9.54*  --    CALCIUM 11.5*  --         IMAGING:  - All imaging reviewed from admission to present day    CT-CHEST (THORAX) W/O   Final Result       1.  There is a large pericardial effusion which now demonstrates increased density suggesting debris or blood products which is new since the prior study of yesterday.   2.  Increasing bilateral airspace disease left greater than right could represent pneumonitis or atelectasis.   3.  Trace amount of dependent pleural effusion.   4.  New dilated thoracic esophagus containing " "internal debris, fluid and air with no gross evidence of mediastinal air.       Fleischner Society pulmonary nodule recommendations:            Echo 6/24/2024:  Normal left ventricular chamber size. Normal left ventricular wall   thickness. Normal left ventricular systolic function. The left   ventricular ejection fraction is visually estimated to be 60-65%.   Normal regional wall motion. Grade II diastolic dysfunction.  Severely dilated left atrium  Aortic valve sclerosis without significant stenosis  Mild tricuspid regurgitation with estimated RV systolic pressure of 41 mmHg.  Moderate pericardial effusion without evidence of hemodynamic left pleural effusion present.      IMPRESSION:  # ESRD   - q MWF at [facility]   # Acute on chronic Pericarditis    - Cardiology eval appreciated, no evidence of tamponade   Cardiology recommends \"initiation of anti-inflammatory therapies. Although not first-line, reasonable to use prednisone for symptom relief.   # HTN  - Goal BP < 140/90  - At goal  # Anemia of CKD  - Goal Hgb 10-11  - At goal  # CKD-MBD      PLAN:  - Plan aggressive hemodialysis for pericarditis and pericardial effusion     HD today and will repeat tomorrow   - Start NSAID's - Ibuprofen 600 mg PO TID and continue on discharge   - Start Prednisone 10 mg PO daily x 4 weeks then 5 mg PO daily   - Start PPI for GERD Prophylaxis - already on Prilosec 40 mg - continue on discharge   - UF as tolerated  - No dietary protein restrictions  - Dose all meds per ESRD    Thank you for the consultation!      Manjinder Raphael MD, MS, FASN, FACP, GREGOR   [facility]   [phone]      "

## 2024-06-24 NOTE — ASSESSMENT & PLAN NOTE
Per description, patient presented with atypical and pleuritic chest pain  On CTA of the chest there is large pericardial effusion.  Patient is hemodynamically stable, no concern for tamponade.  Plan to repeat echo and consult cardiology for possible pericardiocentesis  Troponin is elevated, but appears to be flat in 200s range, while EKG does not show STEMI  Per chart review troponin has been mildly elevated at [facility]  well  CT of the chest showed possible consolidation in the left and right bases and in the light of elevated WBC we will cover with empiric antibiotics for possible underlying pneumonia    patient had a normal left heart catheterization recently  CT of the chest (thorax) showed a new large pericardial effusion with increased density debris or blood products.   Cardiology consulted, subsequent  Echo showed moderate pericardial effusion without evidence of hemodynamic compromise; EF of 60%, RVSP 41.  no interventions indicated.      Her chest pain is more likely due to acute pericarditis.

## 2024-06-24 NOTE — PROGRESS NOTES
Telephone report given to JOSE aVrela for the transfer of the patient to [facility]. No questions from the receiving RN at this time.

## 2024-06-24 NOTE — CARE PLAN
The patient is Stable - Low risk of patient condition declining or worsening    Shift Goals  Clinical Goals: Monitor chest pain/labs, pain control  Patient Goals: Rest, pain control  Family Goals: EDWARD      Problem: Pain - Standard  Goal: Alleviation of pain or a reduction in pain to the patient’s comfort goal  Description: Target End Date:  Prior to discharge or change in level of care    Document on Vitals flowsheet    1.  Document pain using the appropriate pain scale per order or unit policy  2.  Educate and implement non-pharmacologic comfort measures (i.e. relaxation, distraction, massage, cold/heat therapy, etc.)  3.  Pain management medications as ordered  4.  Reassess pain after pain med administration per policy  5.  If opiods administered assess patient's response to pain medication is appropriate per POSS sedation scale  6.  Follow pain management plan developed in collaboration with patient and interdisciplinary team (including palliative care or pain specialists if applicable)  Outcome: Progressing     Problem: Knowledge Deficit - Standard  Goal: Patient and family/care givers will demonstrate understanding of plan of care, disease process/condition, diagnostic tests and medications  Description: Target End Date:  1-3 days or as soon as patient condition allows    Document in Patient Education    1.  Patient and family/caregiver oriented to unit, equipment, visitation policy and means for communicating concern  2.  Complete/review Learning Assessment  3.  Assess knowledge level of disease process/condition, treatment plan, diagnostic tests and medications  4.  Explain disease process/condition, treatment plan, diagnostic tests and medications  Outcome: Progressing

## 2024-06-24 NOTE — PROGRESS NOTES
Bedside shift report received from night shift RN at 0650. Patient A&O, in bed resting. Bed in lowest, locked position with call light and belongings within reach. Fall precautions reviewed with patient. Patient updated on POC. Assessment completed using interpretor Javier, # [id number].

## 2024-06-24 NOTE — PROGRESS NOTES
[facility] Progress Notes     Hemodialysis treatment x 3.5 hours completed as ordered per Dr. Raphael.  Treatment performed at bedside started at 1430 and ended at 1800.       Net UF Removed:  2000 mL     Dialysis Input: 600 mL (200 mL prime + 100 mL bolus+ 300 mL rinseback)  Dialysis Output: 2600 mL    Hemodialysis treatment orders and patient labs reviewed.  Procedure explained, verbalized understanding, consent for treatment obtained.  Patient and access assessed pre and post treatment.  Patient have bilateral upper arm AVF, patent (+)bruit/thrill however LUE AVF is being used due to risk of RUE AVF bursting due to size per patient's nephrologist per patient.   Patient tolerated treatment with some mild hypotensive episodes intra-treatment improved with decreased UF and NS bolus as needed.  UF adjusted as blood pressure tolerates.  Seen and examined by Dr. Raphael during treatment.  Patient hypotensive post treatment BPs 80s/40s then further dropped to 70s/40s, denies s/s, patient awake and alert, speaking full sentences, able to make needs known, reports left posterior headache, left shoulder and left sided chest pain exacerbated with movement PCN notified- PCN notified hospitalist.  See dialysis electronic flow sheets under media for details.     Post tx access: 15 g cannulation needles removed from LUE upper arm AVF access sites, hemostasis established on each insertion site within 5 minutes each; verified no bleeding. (+) bruit/thrill post treatment. Covered with clean gauze dressings and secured with tape.     Please monitor for LUE upper AVF access bleeding. Should any breakthrough bleeding occur, please apply gauze and firm pressure on site until bleeding resolves then cover with gauze dressing and tape. Please observe LUE precautions- NO taking of BPs or blood draws to LUE.    Please notify Nephrologist/Dialysis for follow-up.    Report given to primary care nurse KARLIE Novak  JOSE.

## 2024-06-24 NOTE — ED NOTES
Med rec complete per pt via  (Debbie, [id number]) with med list (returned)   Allergies reviewed.   Outpatient antibiotics in the last 30 days? Yes   Anticoagulants taken in the last 14 days? No   Patient takes Aspirin 81 daily, last dose: 6/23/24 at 1100    Pharmacy patient utilizes: [facility] in [place]    Brisa Perez CPhT

## 2024-06-24 NOTE — ED NOTES
Patient has fistula both upper extremities. Left fistula is active for hemodialysis use, last use Friday. Right fistula inactive but with bruit. Patient states last use almost 1 year ago.  Patient also states blood drawn done on right forearm last Sunday.      Use right arm for IV as per ERP.

## 2024-06-24 NOTE — ASSESSMENT & PLAN NOTE
Presented with pleuritic chest pain, bibasilar consolidation on CT of the chest, elevated procalcitonin and WBC 12.9  Plan to treat with ceftriaxone 5 days total and azithromycin for possible community-acquired pneumonia  Pulse ox, supplemental oxygen as needed

## 2024-06-24 NOTE — PROGRESS NOTES
4 Eyes Skin Assessment Completed by JOSE Mendoza and Rene ACT-A    Head WDL  Ears WDL  Nose WDL  Mouth WDL  Neck WDL  Breast/Chest WDL  Shoulder Blades WDL  Spine WDL  (R) Arm/Elbow/Hand Fistula present on right arm  (L) Arm/Elbow/Hand  Fistula present on left arm   Abdomen WDL  Groin WDL  Scrotum/Coccyx/Buttocks WDL  (R) Leg WDL  (L) Leg WDL  (R) Heel/Foot/Toe WDL  (L) Heel/Foot/Toe WDL          Devices In Places Blood Pressure Cuff      Interventions In Place Pillows    Possible Skin Injury No    Pictures Uploaded Into Epic N/A  Wound Consult Placed N/A  RN Wound Prevention Protocol Ordered No

## 2024-06-24 NOTE — ED NOTES
Taken patient from triage waiting room, ambulatory with steady gait, alert/ oriented x 4.Verified patient identification.  Assumed patient care.   Placed on patient room. Changed clothes to hospital gown. Connected to cardiac monitor.   Given the call light and instructed to call for any assistance needed/ or concerns.   Bed on lowest position, side rails up, breaks locked. Awaiting for ERP.      Patient complains of 8/10 pain on right shoulder upper back and knee. Patient on RA, no labored breathing. Fistula noted on both upper extremities.

## 2024-06-24 NOTE — ED PROVIDER NOTES
ED Provider Note    CHIEF COMPLAINT  Chief Complaint   Patient presents with    Chest Pain       EXTERNAL RECORDS REVIEWED  Inpatient Notes discharge summary from 6/21/2024 she admitted for chest pain had normal left heart catheterization, noted history of end-stage renal disease Monday, Wednesday, Friday, pericardial effusion no signs of tamponade    HPI/ROS  LIMITATION TO HISTORY   Select: Language Greenlandic,  Used   OUTSIDE HISTORIAN(S):      Yasemin Villagran is a 63 y.o. female who presents with chest pain.  Patient says she has been having ongoing pain in her left chest and shoulder for the past month.  Patient says that she was recently discharged from another hospital and has been having ongoing pain.  Patient is hide to come to this hospital was her pain has not been adequately controlled at the outside hospital.  Patient denies fever, chills, nausea, vomiting.  Patient says she has had ongoing cough.  Patient denies changes in urination, bloody stool.    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    SURGICAL HISTORY   has a past surgical history that includes av fistula thrombolysis; colonoscopy - endo (12/20/2016); and av fistula creation (Left, 12/26/2022).    FAMILY HISTORY  Family History   Problem Relation Age of Onset    Diabetes Father     Lung Disease Sister     No Known Problems Brother        SOCIAL HISTORY  Social History  "    Tobacco Use    Smoking status: Never    Smokeless tobacco: Never   Vaping Use    Vaping status: Never Used   Substance and Sexual Activity    Alcohol use: No     Alcohol/week: 0.0 oz    Drug use: No    Sexual activity: Not Currently       CURRENT MEDICATIONS  Home Medications       Reviewed by Abhishek Underwood (Pharmacy Tech) on 06/24/24 at 0116  Med List Status: Complete     Medication Last Dose Status   acetaminophen (TYLENOL) 325 MG Tab UNK Active   amLODIPine (NORVASC) 10 MG Tab 6/22/2024 Active   amoxicillin-clavulanate (AUGMENTIN) 875-125 MG Tab 6/19/2024 Active   aspirin 81 MG EC tablet 6/23/2024 Active   atorvastatin (LIPITOR) 40 MG Tab 6/22/2024 Active   azithromycin (ZITHROMAX) 500 MG tablet 6/16/2024 Active   cloNIDine (CATAPRES) 0.1 MG Tab 6/23/2024 Active   cyanocobalamin (VITAMIN B-12) 100 MCG Tab 6/23/2024 Active   losartan (COZAAR) 25 MG Tab 6/23/2024 Active   MELATONIN PO FEW DAYS AGO Active   metoprolol tartrate (LOPRESSOR) 25 MG Tab 6/23/2024 Active   omeprazole (PRILOSEC) 40 MG delayed-release capsule 6/23/2024 Active   polyethylene glycol/lytes (MIRALAX) Pack > 1 WEEK Active   sevelamer (RENAGEL) 800 MG Tab 6/23/2024 Active                  Audit from Redirected Encounters    **Home medications have not yet been reviewed for this encounter**         ALLERGIES  No Known Allergies    PHYSICAL EXAM  VITAL SIGNS: BP (!) 141/73 Comment: RN notified  Pulse 89   Temp 36.2 °C (97.1 °F) (Temporal)   Resp 16   Ht 1.549 m (5' 1\")   Wt 47.9 kg (105 lb 9.6 oz)   SpO2 93%   BMI 19.95 kg/m²    Constitutional: Chronically ill-appearing  HENT: No signs of trauma, Bilateral external ears normal, Nose normal.   Eyes: Pupils are equal and reactive, Conjunctiva normal, Non-icteric.   Neck: Normal range of motion, No tenderness, Supple, No stridor.   Cardiovascular: Regular rate and rhythm, no murmurs.   Thorax & Lungs: Normal breath sounds, No respiratory distress, No wheezing, No chest tenderness. "   Abdomen: Bowel sounds normal, Soft, No tenderness, No masses, No pulsatile masses.   Skin: Warm, Dry, No erythema, No rash.   Back: No bony tenderness, No CVA tenderness.   Extremities: Bilateral AV fistulas with normal bruit and thrill, no overlying erythema or induration intact distal pulses, No edema, No tenderness, No cyanosis  Musculoskeletal: Good range of motion in all major joints. No tenderness to palpation or major deformities noted.   Neurologic: Alert , Normal motor function, Normal sensory function, No focal deficits noted.       EKG/LABS  Labs Reviewed   CBC WITH DIFFERENTIAL - Abnormal; Notable for the following components:       Result Value    WBC 12.9 (*)     RBC 3.26 (*)     Hemoglobin 10.5 (*)     Hematocrit 34.1 (*)     .6 (*)     MCHC 30.8 (*)     RDW 61.0 (*)     Neutrophils-Polys 78.20 (*)     Lymphocytes 8.90 (*)     Neutrophils (Absolute) 10.06 (*)     Monos (Absolute) 1.48 (*)     All other components within normal limits   COMP METABOLIC PANEL - Abnormal; Notable for the following components:    Sodium 134 (*)     Chloride 93 (*)     Anion Gap 20.0 (*)     Glucose 118 (*)     Bun 58 (*)     Creatinine 9.54 (*)     Calcium 11.5 (*)     Correct Calcium 11.6 (*)     Alkaline Phosphatase 112 (*)     All other components within normal limits   TROPONIN - Abnormal; Notable for the following components:    Troponin T 224 (*)     All other components within normal limits   TROPONIN - Abnormal; Notable for the following components:    Troponin T 212 (*)     All other components within normal limits   ESTIMATED GFR - Abnormal; Notable for the following components:    GFR (CKD-EPI) 4 (*)     All other components within normal limits   SED RATE - Abnormal; Notable for the following components:    Sed Rate Westergren 84 (*)     All other components within normal limits   CRP QUANTITIVE (NON-CARDIAC) - Abnormal; Notable for the following components:    Stat C-Reactive Protein 14.02 (*)     All  other components within normal limits   PROCALCITONIN - Abnormal; Notable for the following components:    Procalcitonin 2.46 (*)     All other components within normal limits     Results for orders placed or performed during the hospital encounter of 24   EKG (NOW)   Result Value Ref Range    Report       [facility] Emergency Dept.    Test Date:  2024  Pt Name:    SERGIO CHAPIN             Department: ER  MRN:        [id number]                      Room:  Gender:     Female                       Technician: [id number]  :        1960                   Requested By:ER TRIAGE PROTOCOL  Order #:    [id number]                    Reading MD: Sadi Carlson    Measurements  Intervals                                Axis  Rate:       71                           P:          9  UT:         151                          QRS:        22  QRSD:       81                           T:          78  QT:         380  QTc:        413    Interpretive Statements  Interpreted by me: Sinus rhythm, rate 71, normal intervals, no signs of acute  ischemia when compared to prior  Electronically Signed On 2024 22:23:52 PDT by Sadi Carlson           I have independently interpreted this EKG    RADIOLOGY/PROCEDURES   I have independently interpreted the diagnostic imaging associated with this visit and am waiting the final reading from the radiologist.   My preliminary interpretation is as follows: No pneumothorax    Radiologist interpretation:  DX-CHEST-PORTABLE (1 VIEW)   Final Result      Enlargement of the cardiac silhouette is consistent with a pericardial effusion.      DX-SHOULDER 2+ LEFT   Final Result      No radiographic evidence of acute traumatic injury.      CT-CTA CHEST PULMONARY ARTERY W/ RECONS   Final Result         1. There are small bilateral pleural effusions, left larger than right.   2. There is compressive atelectasis and consolidation of the lower lobes, left worse than  right.   3. Pericardial effusion.   4. No evidence for pulmonary embolism.   5. Atherosclerosis including coronary artery disease.            EC-ECHOCARDIOGRAM COMPLETE W/O CONT    (Results Pending)       COURSE & MEDICAL DECISION MAKING    ASSESSMENT, COURSE AND PLAN  Care Narrative: Patient has had extensive workup at outside hospital including recent left heart catheterization which was normal.  Imaging from outside hospital showed moderate size pericardial effusion no tamponade physiology.  Patient currently normotensive.  With patient's ongoing pleuritic chest pain, cough and pericardial effusion seen on outside hospital will obtain CT imaging to assess for signs of pneumonia, PE as well as effusion.  Initial troponin here is elevated but patient does have his known end-stage renal disease and had normal left heart catheterization overall low suspicion for ACS.  ECG without signs of acute ischemia.      CTA negative for PE but does show a large pericardial effusion.  Spoke with hospitalist regarding admission for ongoing management of chest pain as well as echocardiogram to assess progression of pericardial effusion.  With patient's ongoing cough worsening pleuritic chest pain and ongoing signs of consolidation on CT imaging will treat for possibility of pneumonia.        ADDITIONAL PROBLEMS MANAGED      DISPOSITION AND DISCUSSIONS  I have discussed management of the patient with the following physicians and KAT's: Hospitalist      FINAL DIAGNOSIS  1. Chest pain, unspecified type    2. Pericardial effusion    3. Hypercalcemia           Electronically signed by: Sadi Carlson D.O., 6/23/2024 10:19 PM

## 2024-06-24 NOTE — PROGRESS NOTES
Patient transferred to [facility] by [facility] on telemetry with all belongings.

## 2024-06-25 DIAGNOSIS — I30.0 ACUTE IDIOPATHIC PERICARDITIS: ICD-10-CM

## 2024-06-25 DIAGNOSIS — I31.39 PERICARDIAL EFFUSION: ICD-10-CM

## 2024-06-25 PROBLEM — E87.5 HYPERKALEMIA: Status: ACTIVE | Noted: 2024-06-25

## 2024-06-25 PROBLEM — I95.9 HYPOTENSION: Status: ACTIVE | Noted: 2024-06-25

## 2024-06-25 LAB
1,25(OH)2D3 SERPL-MCNC: 18.7 PG/ML (ref 19.9–79.3)
ALBUMIN SERPL BCP-MCNC: 3.5 G/DL (ref 3.2–4.9)
ALBUMIN/GLOB SERPL: 1.1 G/DL
ALP SERPL-CCNC: 114 U/L (ref 30–99)
ALT SERPL-CCNC: 15 U/L (ref 2–50)
ANION GAP SERPL CALC-SCNC: 17 MMOL/L (ref 7–16)
AST SERPL-CCNC: 15 U/L (ref 12–45)
BASOPHILS # BLD AUTO: 0.3 % (ref 0–1.8)
BASOPHILS # BLD: 0.03 K/UL (ref 0–0.12)
BILIRUB SERPL-MCNC: 0.4 MG/DL (ref 0.1–1.5)
BUN SERPL-MCNC: 32 MG/DL (ref 8–22)
CALCIUM ALBUM COR SERPL-MCNC: 11.1 MG/DL (ref 8.5–10.5)
CALCIUM SERPL-MCNC: 10.7 MG/DL (ref 8.5–10.5)
CHLORIDE SERPL-SCNC: 93 MMOL/L (ref 96–112)
CO2 SERPL-SCNC: 24 MMOL/L (ref 20–33)
CREAT SERPL-MCNC: 5.29 MG/DL (ref 0.5–1.4)
EOSINOPHIL # BLD AUTO: 0 K/UL (ref 0–0.51)
EOSINOPHIL NFR BLD: 0 % (ref 0–6.9)
ERYTHROCYTE [DISTWIDTH] IN BLOOD BY AUTOMATED COUNT: 59.7 FL (ref 35.9–50)
GFR SERPLBLD CREATININE-BSD FMLA CKD-EPI: 9 ML/MIN/1.73 M 2
GLOBULIN SER CALC-MCNC: 3.3 G/DL (ref 1.9–3.5)
GLUCOSE SERPL-MCNC: 153 MG/DL (ref 65–99)
HCT VFR BLD AUTO: 33.7 % (ref 37–47)
HGB BLD-MCNC: 10.6 G/DL (ref 12–16)
IMM GRANULOCYTES # BLD AUTO: 0.03 K/UL (ref 0–0.11)
IMM GRANULOCYTES NFR BLD AUTO: 0.3 % (ref 0–0.9)
LYMPHOCYTES # BLD AUTO: 0.46 K/UL (ref 1–4.8)
LYMPHOCYTES NFR BLD: 4.8 % (ref 22–41)
MCH RBC QN AUTO: 32.6 PG (ref 27–33)
MCHC RBC AUTO-ENTMCNC: 31.5 G/DL (ref 32.2–35.5)
MCV RBC AUTO: 103.7 FL (ref 81.4–97.8)
MONOCYTES # BLD AUTO: 0.54 K/UL (ref 0–0.85)
MONOCYTES NFR BLD AUTO: 5.6 % (ref 0–13.4)
NEUTROPHILS # BLD AUTO: 8.52 K/UL (ref 1.82–7.42)
NEUTROPHILS NFR BLD: 89 % (ref 44–72)
NRBC # BLD AUTO: 0 K/UL
NRBC BLD-RTO: 0 /100 WBC (ref 0–0.2)
PLATELET # BLD AUTO: 324 K/UL (ref 164–446)
PMV BLD AUTO: 9.5 FL (ref 9–12.9)
POTASSIUM SERPL-SCNC: 4.9 MMOL/L (ref 3.6–5.5)
PROT SERPL-MCNC: 6.8 G/DL (ref 6–8.2)
RBC # BLD AUTO: 3.25 M/UL (ref 4.2–5.4)
SODIUM SERPL-SCNC: 134 MMOL/L (ref 135–145)
WBC # BLD AUTO: 9.6 K/UL (ref 4.8–10.8)

## 2024-06-25 PROCEDURE — 700111 HCHG RX REV CODE 636 W/ 250 OVERRIDE (IP): Performed by: INTERNAL MEDICINE

## 2024-06-25 PROCEDURE — A9270 NON-COVERED ITEM OR SERVICE: HCPCS | Performed by: INTERNAL MEDICINE

## 2024-06-25 PROCEDURE — 700102 HCHG RX REV CODE 250 W/ 637 OVERRIDE(OP): Performed by: INTERNAL MEDICINE

## 2024-06-25 PROCEDURE — 99233 SBSQ HOSP IP/OBS HIGH 50: CPT | Performed by: STUDENT IN AN ORGANIZED HEALTH CARE EDUCATION/TRAINING PROGRAM

## 2024-06-25 PROCEDURE — 770020 HCHG ROOM/CARE - TELE (206)

## 2024-06-25 PROCEDURE — 36415 COLL VENOUS BLD VENIPUNCTURE: CPT

## 2024-06-25 PROCEDURE — 80053 COMPREHEN METABOLIC PANEL: CPT

## 2024-06-25 PROCEDURE — 700111 HCHG RX REV CODE 636 W/ 250 OVERRIDE (IP)

## 2024-06-25 PROCEDURE — 99232 SBSQ HOSP IP/OBS MODERATE 35: CPT | Mod: FS | Performed by: INTERNAL MEDICINE

## 2024-06-25 PROCEDURE — 90935 HEMODIALYSIS ONE EVALUATION: CPT

## 2024-06-25 PROCEDURE — 85025 COMPLETE CBC W/AUTO DIFF WBC: CPT

## 2024-06-25 RX ORDER — HEPARIN SODIUM 1000 [USP'U]/ML
INJECTION, SOLUTION INTRAVENOUS; SUBCUTANEOUS
Status: COMPLETED
Start: 2024-06-25 | End: 2024-06-25

## 2024-06-25 RX ADMIN — IBUPROFEN 600 MG: 600 TABLET, FILM COATED ORAL at 01:06

## 2024-06-25 RX ADMIN — PREDNISONE 10 MG: 10 TABLET ORAL at 05:23

## 2024-06-25 RX ADMIN — HEPARIN SODIUM 500 UNITS: 1000 INJECTION, SOLUTION INTRAVENOUS; SUBCUTANEOUS at 08:00

## 2024-06-25 RX ADMIN — IBUPROFEN 600 MG: 600 TABLET, FILM COATED ORAL at 06:11

## 2024-06-25 RX ADMIN — HEPARIN SODIUM 5000 UNITS: 5000 INJECTION, SOLUTION INTRAVENOUS; SUBCUTANEOUS at 23:21

## 2024-06-25 RX ADMIN — IBUPROFEN 600 MG: 600 TABLET, FILM COATED ORAL at 15:04

## 2024-06-25 RX ADMIN — SEVELAMER CARBONATE 800 MG: 800 TABLET, FILM COATED ORAL at 18:15

## 2024-06-25 RX ADMIN — OMEPRAZOLE 40 MG: 20 CAPSULE, DELAYED RELEASE ORAL at 05:23

## 2024-06-25 RX ADMIN — SEVELAMER CARBONATE 800 MG: 800 TABLET, FILM COATED ORAL at 12:58

## 2024-06-25 RX ADMIN — HEPARIN SODIUM 1300 UNITS: 1000 INJECTION, SOLUTION INTRAVENOUS; SUBCUTANEOUS at 08:13

## 2024-06-25 RX ADMIN — SENNOSIDES AND DOCUSATE SODIUM 2 TABLET: 50; 8.6 TABLET ORAL at 18:15

## 2024-06-25 RX ADMIN — HEPARIN SODIUM 5000 UNITS: 5000 INJECTION, SOLUTION INTRAVENOUS; SUBCUTANEOUS at 15:04

## 2024-06-25 RX ADMIN — SEVELAMER CARBONATE 800 MG: 800 TABLET, FILM COATED ORAL at 07:54

## 2024-06-25 RX ADMIN — HEPARIN SODIUM 5000 UNITS: 5000 INJECTION, SOLUTION INTRAVENOUS; SUBCUTANEOUS at 05:22

## 2024-06-25 ASSESSMENT — ENCOUNTER SYMPTOMS
NAUSEA: 0
BRUISES/BLEEDS EASILY: 0
PSYCHIATRIC NEGATIVE: 1
CONSTIPATION: 0
FEVER: 0
SHORTNESS OF BREATH: 1
DIAPHORESIS: 0
CHEST TIGHTNESS: 0
FATIGUE: 0
VOMITING: 0
DIZZINESS: 0
HEMATOLOGIC/LYMPHATIC NEGATIVE: 1
CHILLS: 0
LIGHT-HEADEDNESS: 0
ENDOCRINE NEGATIVE: 1
GASTROINTESTINAL NEGATIVE: 1
COUGH: 0
ALLERGIC/IMMUNOLOGIC NEGATIVE: 1
PALPITATIONS: 0
EYES NEGATIVE: 1
MUSCULOSKELETAL NEGATIVE: 1
NEUROLOGICAL NEGATIVE: 1
CONSTITUTIONAL NEGATIVE: 1
DIARRHEA: 0

## 2024-06-25 ASSESSMENT — PAIN DESCRIPTION - PAIN TYPE: TYPE: ACUTE PAIN

## 2024-06-25 ASSESSMENT — FIBROSIS 4 INDEX: FIB4 SCORE: 0.73

## 2024-06-25 NOTE — PROGRESS NOTES
Cardiology Follow Up Progress Note    Date of Service  6/25/2024    Attending Physician  Eliz Harper M.D.    Chief Complaint   Chest pain    HPI  Yasemin Villagran is a 63 y.o. female with a history of ESRD (M, W, F), hypertension, dyslipidemia, hypothyroidism and a known pericardial effusion admitted 6/23/2024 with pericarditis.    Interim Events  6/25/2024: She was started on prednisone and NSAIDs yesterday.  She underwent hemodialysis and had some hypotension posttreatment.  No cardiac events overnight.  Sinus rhythm on telemetry.  Vital signs stable.  SpO2 94 to 90% on 2 L nasal cannula.  An  was used for today's encounter.  She reports she is doing very well today and her symptoms have improved.  She does have some intermittent shortness of breath and feels that it is occasionally difficult to inspire.  Her chest pain has also markedly improved; she now only has some very mild chest pain when moving laterally.  No palpitations.  No orthopnea or PND.  No lower extremity edema.  No dizziness or lightheadedness.  No syncope or presyncope.    Review of Systems  Review of Systems   Constitutional: Negative.  Negative for chills, diaphoresis, fatigue and fever.   HENT: Negative.     Eyes: Negative.    Respiratory:  Positive for shortness of breath. Negative for cough and chest tightness.    Cardiovascular:  Positive for chest pain. Negative for palpitations and leg swelling.   Gastrointestinal: Negative.  Negative for constipation, diarrhea, nausea and vomiting.   Endocrine: Negative.    Genitourinary: Negative.  Negative for decreased urine volume, difficulty urinating, dysuria and frequency.   Musculoskeletal: Negative.    Skin: Negative.    Allergic/Immunologic: Negative.    Neurological: Negative.  Negative for dizziness and light-headedness.   Hematological: Negative.  Does not bruise/bleed easily.   Psychiatric/Behavioral: Negative.         Vital signs in last 24 hours  Temp:  [36.3 °C (97.3  °F)-36.5 °C (97.7 °F)] 36.5 °C (97.7 °F)  Pulse:  [72-93] 93  Resp:  [16-18] 18  BP: ()/(41-63) 114/61  SpO2:  [92 %-98 %] 98 %    Physical Exam  Physical Exam  Vitals and nursing note reviewed.   Constitutional:       General: She is not in acute distress.     Appearance: Normal appearance. She is not toxic-appearing.   HENT:      Head: Normocephalic and atraumatic.      Right Ear: External ear normal.      Left Ear: External ear normal.      Nose: Nose normal.      Mouth/Throat:      Mouth: Mucous membranes are moist.      Pharynx: Oropharynx is clear.   Eyes:      General: No scleral icterus.     Extraocular Movements: Extraocular movements intact.      Conjunctiva/sclera: Conjunctivae normal.      Pupils: Pupils are equal, round, and reactive to light.   Neck:      Vascular: No JVD.   Cardiovascular:      Rate and Rhythm: Normal rate and regular rhythm.      Pulses: Normal pulses.      Heart sounds: Normal heart sounds. No murmur heard.     No friction rub. No gallop.   Pulmonary:      Effort: Pulmonary effort is normal.      Breath sounds: Normal breath sounds.   Abdominal:      General: Abdomen is flat. Bowel sounds are normal. There is no distension.      Palpations: Abdomen is soft.   Musculoskeletal:         General: Normal range of motion.      Cervical back: Normal range of motion and neck supple.      Right lower leg: No edema.      Left lower leg: No edema.   Skin:     General: Skin is warm and dry.      Capillary Refill: Capillary refill takes less than 2 seconds.      Coloration: Skin is not jaundiced.   Neurological:      General: No focal deficit present.      Mental Status: She is alert and oriented to person, place, and time.   Psychiatric:         Mood and Affect: Mood normal.         Behavior: Behavior normal.         Lab Review  Lab Results   Component Value Date/Time    WBC 9.6 06/25/2024 02:49 AM    RBC 3.25 (L) 06/25/2024 02:49 AM    HEMOGLOBIN 10.6 (L) 06/25/2024 02:49 AM     "HEMATOCRIT 33.7 (L) 06/25/2024 02:49 AM    .7 (H) 06/25/2024 02:49 AM    MCH 32.6 06/25/2024 02:49 AM    MCHC 31.5 (L) 06/25/2024 02:49 AM    MPV 9.5 06/25/2024 02:49 AM      Lab Results   Component Value Date/Time    SODIUM 134 (L) 06/25/2024 02:49 AM    POTASSIUM 4.9 06/25/2024 02:49 AM    CHLORIDE 93 (L) 06/25/2024 02:49 AM    CO2 24 06/25/2024 02:49 AM    GLUCOSE 153 (H) 06/25/2024 02:49 AM    BUN 32 (H) 06/25/2024 02:49 AM    CREATININE 5.29 (HH) 06/25/2024 02:49 AM    GLOMRATE 8 (L) 04/13/2023 12:53 PM      Lab Results   Component Value Date/Time    ASTSGOT 15 06/25/2024 02:49 AM    ALTSGPT 15 06/25/2024 02:49 AM     Lab Results   Component Value Date/Time    CHOLSTRLTOT 159 06/17/2019 08:56 AM    LDL 98 06/17/2019 08:56 AM    HDL 44 06/17/2019 08:56 AM    TRIGLYCERIDE 104 06/17/2019 08:56 AM    TROPONINT 212 (H) 06/24/2024 12:28 AM       No results for input(s): \"NTPROBNP\" in the last 72 hours.    Cardiac Imaging and Procedures Review  Cardiac Cath [facility] 6/20/2024:  Coronary Findings Diagnostic Dominance: Right   Left Main: The vessel was visualized by angiography, is moderate in size and is angiographically normal.   Left Anterior Descending: The vessel was visualized by angiography, is moderate in size and is angiographically normal.   Left Circumflex: The vessel was visualized by angiography, is moderate in size and is angiographically normal.   Right Coronary Artery: The vessel was visualized by angiography, is moderate in size and is angiographically normal.      Echo [facility] 6/19/2024:  Interpretation Summary   Conclusion     Moderate size pericardial effusion noted posteriorly. RAP is 3 mm Hg. There is minimal increase in   the size of the effusion with no evidence of cardiac tamponade noted when compared with previous   echo.     Normal LV size and function with EF of 60-65 %.   Dilated RV size and systolic function.         Echo 6/24/2024:  Normal left ventricular chamber size. " Normal left ventricular wall   thickness. Normal left ventricular systolic function. The left   ventricular ejection fraction is visually estimated to be 60-65%.   Normal regional wall motion. Grade II diastolic dysfunction.  Severely dilated left atrium  Aortic valve sclerosis without significant stenosis  Mild tricuspid regurgitation with estimated RV systolic pressure of 41 mmHg.  Moderate pericardial effusion without evidence of hemodynamic left pleural effusion present.    Imaging  Chest X-Ray:  6/24/2024   FINDINGS:  The cardiomediastinal silhouette is enlarged. This is consistent with a pericardial effusion noted on the CT of the chest reported separately. There is obscuration of the left hemidiaphragm consistent with a left pleural effusion and left basilar   consolidation. No pneumothorax is evident. There is a right subclavian stent.  IMPRESSION:  Enlargement of the cardiac silhouette is consistent with a pericardial effusion.    Assessment/Plan  #Acute pericarditis  #Pericardial effusion without evidence of hemodynamic compromise.  #Hypertension  #ESRD on hemodialysis (M, W, F)    Recommendations:  -Her symptoms have markedly improved with the addition of prednisone and ibuprofen.  -Moderate pericardial effusion on echocardiogram which has remained stable.  -Continue prednisone 10 mg daily for 1 month.  -Continue ibuprofen 600 mg every 8 hours.  -Repeat an echocardiogram in 1 month as an outpatient.  -Continue hemodialysis for volume optimization.    Cardiology will sign off.    Please contact me with any questions.    Thank you for allowing me to participate in the care of Yasemin Villagran .    Sallie Wallace PA-C, Cardiology  [facility]  [facility], [address]  [address] [address]  Phone: [phone]  Fax: [phone]    PLEASE NOTE: This note was created using voice recognition software. I have made every  reasonable attempt to correct obvious errors, but I expect that there are errors of grammar and possibly content that I did not discover before finalizing the note.     I personally spent a total of 15 minutes which includes face-to-face time and non-face-to-face time spent on preparing to see the patient, reviewing hospital notes and tests, obtaining history from the patient, performing a medically appropriate exam, counseling and educating the patient, ordering medications/tests/procedures/referrals as clinically indicated, and documenting information in the electronic medical record.

## 2024-06-25 NOTE — CARE PLAN
The patient is Stable - Low risk of patient condition declining or worsening    Shift Goals  Clinical Goals: pain control, VSS  Patient Goals: pain control  Family Goals: No family present    Progress made toward(s) clinical / shift goals:    Problem: Pain - Standard  Goal: Alleviation of pain or a reduction in pain to the patient’s comfort goal  Outcome: Progressing  Note: Patient has pain to left chest associated with pericardial effusion. Medicated with ibuprofen regularly for same     Problem: Knowledge Deficit - Standard  Goal: Patient and family/care givers will demonstrate understanding of plan of care, disease process/condition, diagnostic tests and medications  Outcome: Progressing  Note: PT updated on POC. No questions or concerns       Patient is not progressing towards the following goals:

## 2024-06-25 NOTE — CARE PLAN
The patient is Stable - Low risk of patient condition declining or worsening    Shift Goals  Clinical Goals: Echocardiogram, pain control  Patient Goals: Pain control  Family Goals: No family present    Progress made toward(s) clinical / shift goals:    Problem: Pain - Standard  Goal: Alleviation of pain or a reduction in pain to the patient’s comfort goal  Outcome: Progressing     Problem: Hemodynamics  Goal: Patient's hemodynamics, fluid balance and neurologic status will be stable or improve  Outcome: Progressing       Patient is not progressing towards the following goals:       Show Date Of Previous Biopsy Variable: Yes

## 2024-06-25 NOTE — ASSESSMENT & PLAN NOTE
CT of the chest (thorax) showed a new large pericardial effusion with increased density debris or blood products.   Cardiology consulted, subsequent  Echo showed moderate pericardial effusion without evidence of hemodynamic compromise; EF of 60%, RVSP 41.  no interventions indicated.    Her chest pain is more likely due to acute pericarditis.       continue prednisone 10 mg for a month; colchicine 0.6 mg every 6 hours; repeat echo in a month

## 2024-06-25 NOTE — PROGRESS NOTES
4 Eyes Skin Assessment Completed by JOSE Varela and JOSE Magana.    Head WDL  Ears WDL  Nose WDL  Mouth WDL  Neck WDL  Breast/Chest Scab biopsy site  Shoulder Blades WDL  Spine WDL  (R) Arm/Elbow/Hand WDL Fistula present on right arm   (L) Arm/Elbow/Hand WDL Fistula present on left arm   Abdomen WDL  Groin WDL  Scrotum/Coccyx/Buttocks WDL  (R) Leg WDL  (L) Leg WDL  (R) Heel/Foot/Toe WDL  (L) Heel/Foot/Toe WDL          Devices In Places Tele Box and Blood Pressure Cuff      Interventions In Place Pressure Redistribution Mattress    Possible Skin Injury No    Pictures Uploaded Into Epic No, needs to be completed  Wound Consult Placed N/A  RN Wound Prevention Protocol Ordered No     No

## 2024-06-25 NOTE — PROGRESS NOTES
[facility]     Dialysis treatment started at 0813 and completed at 1113. Nephrologist Dr. Raphael notified of treatment start.     NET UF: 2000 mL     Patient is A&Ox4, no pain and discomfort reported. BP elevated, no chest pain requested for 3 Hrs dialysis treatment. Left UA AVF has Bruitt and thrill and no signs and symptoms of infection. AVF cannulated and no access issues encountered. Lines are patent w/ good blood flow. Lab results and orders reviewed prior to treatment start.     Patient completed and tolerated dialysis treatment well w/o complications. BP stayed elevated, Left UA AVF Deaccessed, No bleeding noted after needle removal. Manual access pressure applied x 10mins. Patient and PCN instructed to monitor Left UA AVF site for bleeding and to re-enforce pressure dressing if needed.     1145 Report given to PCN MARLENI Childress RN    See Dialysis treatment flow sheet for details.

## 2024-06-25 NOTE — PROGRESS NOTES
Hospital Medicine Daily Progress Note    Date of Service  6/25/2024    Chief Complaint  Yasemin Villagran is a 63 y.o. female admitted 6/23/2024 with pericardial effusion    Hospital Course  Yasemin Villagran is a 63 y.o. 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 Patient states she has been to [facility] multiple times and has not felt any improvement after each discharge. Complaints of orthopnea, left sided chest pressure radiating to the left arm, neck, and back worse with inspiration.      patient had a normal left heart catheterization recently  CT of the chest (thorax) showed a new large pericardial effusion with increased density debris or blood products.   Cardiology consulted, subsequent  Echo showed moderate pericardial effusion without evidence of hemodynamic compromise; EF of 60%, RVSP 41.  no interventions indicated.  Her chest pain is more likely due to acute pericarditis.     Interval Problem Update  I saw and examined the patient at bedside  Reported chest pain better     Acute pericarditis -continue prednisone 10 mg for a month; colchicine 0.6 mg every 6 hours; repeat echo in a month     Pericardial effusion without evidence of hemodynamic compromise     Hypotension -I put amlodipine and clonidine on hold     ESRD -continue HD     Hyperkalemia - K 6.8>4.9    I have discussed this patient's plan of care and discharge plan at IDT rounds today with Case Management, Nursing, Nursing leadership, and other members of the IDT team.    Consultants/Specialty  cardiology and nephrology    Code Status  Full Code    Disposition  The patient is not medically cleared for discharge to home or a post-acute facility.      I have placed the appropriate orders for post-discharge needs.    Review  of Systems   All 12 systems were reviewed and negative except as mentioned above      Physical Exam  Temp:  [36.4 °C (97.5 °F)-36.6 °C (97.9 °F)] 36.6 °C (97.9 °F)  Pulse:  [73-93] 86  Resp:  [16-19] 19  BP: ()/(41-63) 88/48  SpO2:  [93 %-98 %] 97 %    Physical Exam  Constitutional:       Appearance: She is obese. She is ill-appearing.   HENT:      Head: Normocephalic and atraumatic.      Nose: Nose normal.      Mouth/Throat:      Mouth: Mucous membranes are moist.   Eyes:      Extraocular Movements: Extraocular movements intact.      Conjunctiva/sclera: Conjunctivae normal.   Cardiovascular:      Rate and Rhythm: Normal rate and regular rhythm.      Pulses: Normal pulses.      Heart sounds: Normal heart sounds.   Pulmonary:      Effort: Pulmonary effort is normal.      Breath sounds: Normal breath sounds.   Abdominal:      General: Bowel sounds are normal.      Palpations: Abdomen is soft.   Musculoskeletal:      Cervical back: Normal range of motion and neck supple.   Neurological:      General: No focal deficit present.      Mental Status: She is alert and oriented to person, place, and time.   Psychiatric:         Mood and Affect: Mood normal.         Fluids    Intake/Output Summary (Last 24 hours) at 6/25/2024 1537  Last data filed at 6/25/2024 1300  Gross per 24 hour   Intake 1690 ml   Output 5100 ml   Net -3410 ml       Laboratory  Recent Labs     06/23/24 2220 06/25/24  0249   WBC 12.9* 9.6   RBC 3.26* 3.25*   HEMOGLOBIN 10.5* 10.6*   HEMATOCRIT 34.1* 33.7*   .6* 103.7*   MCH 32.2 32.6   MCHC 30.8* 31.5*   RDW 61.0* 59.7*   PLATELETCT 351 324   MPV 9.4 9.5     Recent Labs     06/23/24  2220 06/24/24  1254 06/25/24  0249   SODIUM 134*  --  134*   POTASSIUM 5.5 6.8* 4.9   CHLORIDE 93*  --  93*   CO2 21  --  24   GLUCOSE 118*  --  153*   BUN 58*  --  32*   CREATININE 9.54*  --  5.29*   CALCIUM 11.5*  --  10.7*                   Imaging  CT-CHEST (THORAX) W/O   Final Result      1.  There is a  large pericardial effusion which now demonstrates increased density suggesting debris or blood products which is new since the prior study of yesterday.   2.  Increasing bilateral airspace disease left greater than right could represent pneumonitis or atelectasis.   3.  Trace amount of dependent pleural effusion.   4.  New dilated thoracic esophagus containing internal debris, fluid and air with no gross evidence of mediastinal air.      Fleischner Society pulmonary nodule recommendations:   Not Applicable         EC-ECHOCARDIOGRAM COMPLETE W/ CONT   Final Result      DX-CHEST-PORTABLE (1 VIEW)   Final Result      Enlargement of the cardiac silhouette is consistent with a pericardial effusion.      DX-SHOULDER 2+ LEFT   Final Result      No radiographic evidence of acute traumatic injury.      CT-CTA CHEST PULMONARY ARTERY W/ RECONS   Final Result         1. There are small bilateral pleural effusions, left larger than right.   2. There is compressive atelectasis and consolidation of the lower lobes, left worse than right.   3. Pericardial effusion.   4. No evidence for pulmonary embolism.   5. Atherosclerosis including coronary artery disease.                 Assessment/Plan  * Acute idiopathic pericarditis  Assessment & Plan  CT of the chest (thorax) showed a new large pericardial effusion with increased density debris or blood products.   Cardiology consulted, subsequent  Echo showed moderate pericardial effusion without evidence of hemodynamic compromise; EF of 60%, RVSP 41.  no interventions indicated.    Her chest pain is more likely due to acute pericarditis.       continue prednisone 10 mg for a month; colchicine 0.6 mg every 6 hours; repeat echo in a month    Hyperkalemia  Assessment & Plan  Potassium 6.8  Received HD    6/25 potassium 4 point    Hypotension  Assessment & Plan  I put amlodipine and clonidine on hold  Continue telemetry monitor    Pneumonia  Assessment & Plan  Presented with pleuritic chest  pain, bibasilar consolidation on CT of the chest, elevated procalcitonin and WBC 12.9  Plan to treat with ceftriaxone 5 days total and azithromycin for possible community-acquired pneumonia  Pulse ox, supplemental oxygen as needed    Pericardial effusion  Assessment & Plan  CT of the chest (thorax) showed a new large pericardial effusion with increased density debris or blood products.   Cardiology consulted,   subsequent  Echo showed moderate pericardial effusion without evidence of hemodynamic compromise;   EF of 60%, RVSP 41.  no interventions indicated.       Chest pain- (present on admission)  Assessment & Plan  Per description, patient presented with atypical and pleuritic chest pain  On CTA of the chest there is large pericardial effusion.  Patient is hemodynamically stable, no concern for tamponade.  Plan to repeat echo and consult cardiology for possible pericardiocentesis  Troponin is elevated, but appears to be flat in 200s range, while EKG does not show STEMI  Per chart review troponin has been mildly elevated at [facility]  well  CT of the chest showed possible consolidation in the left and right bases and in the light of elevated WBC we will cover with empiric antibiotics for possible underlying pneumonia    patient had a normal left heart catheterization recently  CT of the chest (thorax) showed a new large pericardial effusion with increased density debris or blood products.   Cardiology consulted, subsequent  Echo showed moderate pericardial effusion without evidence of hemodynamic compromise; EF of 60%, RVSP 41.  no interventions indicated.      Her chest pain is more likely due to acute pericarditis.     End stage renal disease on dialysis (HCC)- (present on admission)  Assessment & Plan  I discussed with nephrology  Continue HD  Ordered BMP to monitor    Hypertension- (present on admission)  Assessment & Plan  Resume amlodipine  Monitor blood pressure         VTE prophylaxis:  Heparin    I have performed a physical exam and reviewed and updated ROS and Plan today (6/25/2024). In review of yesterday's note (6/24/2024), there are no changes except as documented above.    I spent greater than 52 minutes for chart review, obtaining history independently, performing medically appropriate examination,  documenting , ordering medications, tests, or procedures, referring and communicating with other health care professionals, Independently interpreting results and communicating results with patient/family/caregiver. More than 50% of time was spent in face-to-face clinical encounter.

## 2024-06-25 NOTE — PROGRESS NOTES
[facility] -  PROGRESS  NOTE               Author: Manjinder Raphael M.D. Date & Time: 6/25/2024  11:48 AM     HISTORY OF PRESENT ILLNESS:    64 yo with known history of ESRD on HD at [facility], known pericardial effusion hypothyroidism, hypertension, dyslipidemia, who presented to ER on 6/23/2024 with complaints of worsening of left sided chest pain, worsening with change in position, deep inspiration, radiating into the left shoulder into the neck.      She was recently admitted to [facility] last week with similar symptoms.  She had extensive cardiac workup performed at that facility including stress test with evidence of ischemia. She underwent cardiac catheterization which showed normal coronary arteries with normal LVEDP.  Echocardiogram at that time showed moderate size pericardial effusion without evidence of tamponade. Results summarized below:   Per cardiology, recommended to continue hemodialysis for the pericardial effusion.   Cardiac Cath [facility] 6/20/2024:  Coronary Findings Diagnostic Dominance: Right   Left Main: The vessel was visualized by angiography, is moderate in size and is angiographically normal.   Left Anterior Descending: The vessel was visualized by angiography, is moderate in size and is angiographically normal.   Left Circumflex: The vessel was visualized by angiography, is moderate in size and is angiographically normal.   Right Coronary Artery: The vessel was visualized by angiography, is moderate in size and is angiographically normal.    Echo [facility] 6/19/2024:  Moderate size pericardial effusion noted posteriorly. RAP is 3 mm Hg. There is minimal increase in the size of the effusion with no evidence of cardiac tamponade noted when compared with previous echo. Normal LV size and function with EF of 60-65 %.   Dilated RV size and systolic function.    She experienced chest pain again yesterday and decided to come to this ER.  "  Upon evaluation in ER, she is hemodynamically stable on room air. Blood work showed troponin 224, 212, elevated procalcitonin 2.46, hypercalcemia with corrected calcium 11.6, WBC 12.9. X-ray of the left shoulder showed no acute abnormalities. Chest x-ray: Enlargement of cardiac silhouette. CT of the chest: Small bilateral pleural effusion, compressive atelectasis and consolidation of the lower lobe, left worse than the right, large pericardial effusion. Patient denies any fever or chills, no nausea, has some shortness of breath but no cough or phlegm. No melena, hematochezia, hematemesis.  No HA, visual changes, or abdominal pain.    NEPHROLOGY DAILY PROGRESS:   6/24: Consult note  6/25: HD yesterday and repeat HD today, started on NSAIDs and oral steroids yesterday for recurrent pericarditis and effusion      PAST MEDICAL/SURGICAL/SOCIAL/FAMILY HISTORY:   Reviewed and remains unchanged from admission   HOME MEDICATIONS:   - Reviewed and documented in chart    LABORATORY STUDIES:   - Reviewed and documented in chart    ALLERGIES:  Patient has no known allergies.    VS:  /61   Pulse 93   Temp 36.5 °C (97.7 °F) (Temporal)   Resp 18   Ht 1.549 m (5' 1\")   Wt 46.2 kg (101 lb 13.6 oz)   SpO2 98%   BMI 19.24 kg/m²   Physical Exam  Constitutional:       Appearance: Normal appearance.   HENT:      Head: Normocephalic and atraumatic.      Right Ear: External ear normal.      Left Ear: External ear normal.      Nose: Nose normal.      Mouth/Throat:      Mouth: Mucous membranes are dry.   Eyes:      Extraocular Movements: Extraocular movements intact.      Pupils: Pupils are equal, round, and reactive to light.   Cardiovascular:      Rate and Rhythm: Normal rate.      Pulses: Normal pulses.   Pulmonary:      Effort: Pulmonary effort is normal. No respiratory distress.      Breath sounds: No stridor.   Abdominal:      Palpations: Abdomen is soft.   Musculoskeletal:      Cervical back: Neck supple.   Neurological: " "     Mental Status: She is alert.            FLUID BALANCE:  In: 870 [P.O.:270; Dialysis:600]  Out: 2600     LABS:  Recent Labs     06/23/24  2220 06/24/24  1254 06/25/24  0249   SODIUM 134*  --  134*   POTASSIUM 5.5 6.8* 4.9   CHLORIDE 93*  --  93*   CO2 21  --  24   GLUCOSE 118*  --  153*   BUN 58*  --  32*   CREATININE 9.54*  --  5.29*   CALCIUM 11.5*  --  10.7*        IMAGING:  - All imaging reviewed from admission to present day    CT-CHEST (THORAX) W/O   Final Result       1.  There is a large pericardial effusion which now demonstrates increased density suggesting debris or blood products which is new since the prior study of yesterday.   2.  Increasing bilateral airspace disease left greater than right could represent pneumonitis or atelectasis.   3.  Trace amount of dependent pleural effusion.   4.  New dilated thoracic esophagus containing internal debris, fluid and air with no gross evidence of mediastinal air.       Fleischner Society pulmonary nodule recommendations:            Echo 6/24/2024:  Normal left ventricular chamber size. Normal left ventricular wall   thickness. Normal left ventricular systolic function. The left   ventricular ejection fraction is visually estimated to be 60-65%.   Normal regional wall motion. Grade II diastolic dysfunction.  Severely dilated left atrium  Aortic valve sclerosis without significant stenosis  Mild tricuspid regurgitation with estimated RV systolic pressure of 41 mmHg.  Moderate pericardial effusion without evidence of hemodynamic left pleural effusion present.      IMPRESSION:  # ESRD   - q MWF at [facility]   # Acute on chronic Pericarditis    - Cardiology eval appreciated, no evidence of tamponade   Cardiology recommends \"initiation of anti-inflammatory therapies. Although not first-line, reasonable to use prednisone for symptom relief.   # HTN  - Goal BP < 140/90  - At goal  # Anemia of CKD  - Goal Hgb 10-11  - At goal  # CKD-MBD      PLAN:  - Repeat HD " today for 3 hrs   - Cont HD q MWF   - Started on NSAID's - Ibuprofen 600 mg PO TID and continue on discharge   - Started on Prednisone 10 mg PO daily x 4 weeks then 5 mg PO daily   - Continue PPI for GERD Prophylaxis - already on Prilosec 40 mg - continue on discharge   - UF as tolerated  - No dietary protein restrictions  - Dose all meds per ESRD    Thank you and we will follow   OK to discharge from Renal standpoint, will continue outpatient HD in [place] on discharge       Manjinder Raphael MD, MS, FASN, FACP, GREGOR   [facility]   [phone]

## 2024-06-25 NOTE — DISCHARGE PLANNING
Outpatient Dialysis Note     Confirmed patient is active at:     [facility]  [address], [place] [address]     Schedule: Mon, Wed, Fri   Time: 5:30 AM     Patient is followed by Dr. Dietrich.     Spoke with Paula at facility who confirmed patient information.   Forwarded records for review.     Xitlaly Reyes   Dialysis Coordinator / [facility]  Ph: [phone]

## 2024-06-25 NOTE — SENIOR ADMIT NOTE
Bedside report received and patient care assumed. Pt is resting in bed, A&O4, with no complaints of pain, and is on 2 L NC. Tele box on. All fall precautions are in place, belongings at bedside table.  Pt was updated on POC, no questions or concerns. Pt educated on use of call light for assistance.

## 2024-06-25 NOTE — PROGRESS NOTES
Bedside report received. Pt care assumed. Pt in bed lying comfortably. AA/Ox4. No complaints or concerns. Updated on POC. Tele box on. Call light and personal belongings in reach. Bed locked and in lowest position. Upper bedrails up. Bed alarm on. Reminded of fall risk. Hourly rounding initiated.

## 2024-06-25 NOTE — PROGRESS NOTES
Phone report received from CDU nurse. Assumed care of pt. PT transferred to T716-2 Pt awake, laying in bed. A/Ox4, VSS Pt oriented to unit and educated to call before getting out of bed. POC reviewed and white board updated. Tele box on. Call light in reach. Bed locked in lowest position with 2 upper bed rails up. Bed alarm on. Pt is a moderate fall risk with assistance of one person necessary.

## 2024-06-26 ENCOUNTER — PHARMACY VISIT (OUTPATIENT)
Dept: PHARMACY | Facility: MEDICAL CENTER | Age: 64
End: 2024-06-26
Payer: COMMERCIAL

## 2024-06-26 VITALS
WEIGHT: 101.85 LBS | DIASTOLIC BLOOD PRESSURE: 53 MMHG | OXYGEN SATURATION: 94 % | TEMPERATURE: 98.1 F | HEART RATE: 85 BPM | HEIGHT: 61 IN | RESPIRATION RATE: 18 BRPM | SYSTOLIC BLOOD PRESSURE: 96 MMHG | BODY MASS INDEX: 19.23 KG/M2

## 2024-06-26 LAB
ANION GAP SERPL CALC-SCNC: 17 MMOL/L (ref 7–16)
BUN SERPL-MCNC: 28 MG/DL (ref 8–22)
CALCIUM SERPL-MCNC: 11 MG/DL (ref 8.5–10.5)
CHLORIDE SERPL-SCNC: 91 MMOL/L (ref 96–112)
CO2 SERPL-SCNC: 27 MMOL/L (ref 20–33)
CREAT SERPL-MCNC: 4.37 MG/DL (ref 0.5–1.4)
ERYTHROCYTE [DISTWIDTH] IN BLOOD BY AUTOMATED COUNT: 60.5 FL (ref 35.9–50)
GFR SERPLBLD CREATININE-BSD FMLA CKD-EPI: 11 ML/MIN/1.73 M 2
GLUCOSE SERPL-MCNC: 93 MG/DL (ref 65–99)
HCT VFR BLD AUTO: 37.5 % (ref 37–47)
HGB BLD-MCNC: 11.7 G/DL (ref 12–16)
MAGNESIUM SERPL-MCNC: 2.4 MG/DL (ref 1.5–2.5)
MCH RBC QN AUTO: 32.8 PG (ref 27–33)
MCHC RBC AUTO-ENTMCNC: 31.2 G/DL (ref 32.2–35.5)
MCV RBC AUTO: 105 FL (ref 81.4–97.8)
PHOSPHATE SERPL-MCNC: 5.1 MG/DL (ref 2.5–4.5)
PLATELET # BLD AUTO: 397 K/UL (ref 164–446)
PMV BLD AUTO: 9.6 FL (ref 9–12.9)
POTASSIUM SERPL-SCNC: 4.3 MMOL/L (ref 3.6–5.5)
RBC # BLD AUTO: 3.57 M/UL (ref 4.2–5.4)
SODIUM SERPL-SCNC: 135 MMOL/L (ref 135–145)
WBC # BLD AUTO: 12.5 K/UL (ref 4.8–10.8)

## 2024-06-26 PROCEDURE — A9270 NON-COVERED ITEM OR SERVICE: HCPCS | Performed by: INTERNAL MEDICINE

## 2024-06-26 PROCEDURE — 84100 ASSAY OF PHOSPHORUS: CPT

## 2024-06-26 PROCEDURE — 700111 HCHG RX REV CODE 636 W/ 250 OVERRIDE (IP): Performed by: INTERNAL MEDICINE

## 2024-06-26 PROCEDURE — 700105 HCHG RX REV CODE 258

## 2024-06-26 PROCEDURE — RXMED WILLOW AMBULATORY MEDICATION CHARGE: Performed by: STUDENT IN AN ORGANIZED HEALTH CARE EDUCATION/TRAINING PROGRAM

## 2024-06-26 PROCEDURE — 97161 PT EVAL LOW COMPLEX 20 MIN: CPT

## 2024-06-26 PROCEDURE — 80048 BASIC METABOLIC PNL TOTAL CA: CPT

## 2024-06-26 PROCEDURE — 97535 SELF CARE MNGMENT TRAINING: CPT

## 2024-06-26 PROCEDURE — 99239 HOSP IP/OBS DSCHRG MGMT >30: CPT | Performed by: STUDENT IN AN ORGANIZED HEALTH CARE EDUCATION/TRAINING PROGRAM

## 2024-06-26 PROCEDURE — 700102 HCHG RX REV CODE 250 W/ 637 OVERRIDE(OP): Performed by: INTERNAL MEDICINE

## 2024-06-26 PROCEDURE — 90935 HEMODIALYSIS ONE EVALUATION: CPT

## 2024-06-26 PROCEDURE — 85027 COMPLETE CBC AUTOMATED: CPT

## 2024-06-26 PROCEDURE — 83735 ASSAY OF MAGNESIUM: CPT

## 2024-06-26 PROCEDURE — 97165 OT EVAL LOW COMPLEX 30 MIN: CPT

## 2024-06-26 RX ORDER — PREDNISONE 10 MG/1
10 TABLET ORAL DAILY
Qty: 30 TABLET | Refills: 0 | Status: SHIPPED | OUTPATIENT
Start: 2024-06-26 | End: 2024-07-26

## 2024-06-26 RX ORDER — SODIUM CHLORIDE 9 MG/ML
250 INJECTION, SOLUTION INTRAVENOUS ONCE
Status: COMPLETED | OUTPATIENT
Start: 2024-06-26 | End: 2024-06-26

## 2024-06-26 RX ORDER — IBUPROFEN 600 MG/1
600 TABLET ORAL EVERY 8 HOURS
Qty: 42 TABLET | Refills: 0 | Status: SHIPPED | OUTPATIENT
Start: 2024-06-26 | End: 2024-07-10

## 2024-06-26 RX ADMIN — HEPARIN SODIUM 5000 UNITS: 5000 INJECTION, SOLUTION INTRAVENOUS; SUBCUTANEOUS at 14:36

## 2024-06-26 RX ADMIN — HEPARIN SODIUM 1300 UNITS: 1000 INJECTION, SOLUTION INTRAVENOUS; SUBCUTANEOUS at 10:30

## 2024-06-26 RX ADMIN — SODIUM CHLORIDE 250 ML: 9 INJECTION, SOLUTION INTRAVENOUS at 00:23

## 2024-06-26 RX ADMIN — PREDNISONE 10 MG: 10 TABLET ORAL at 05:26

## 2024-06-26 RX ADMIN — HEPARIN SODIUM 500 UNITS: 1000 INJECTION, SOLUTION INTRAVENOUS; SUBCUTANEOUS at 10:25

## 2024-06-26 RX ADMIN — OMEPRAZOLE 40 MG: 20 CAPSULE, DELAYED RELEASE ORAL at 05:26

## 2024-06-26 RX ADMIN — SEVELAMER CARBONATE 800 MG: 800 TABLET, FILM COATED ORAL at 08:37

## 2024-06-26 RX ADMIN — IBUPROFEN 600 MG: 600 TABLET, FILM COATED ORAL at 14:36

## 2024-06-26 RX ADMIN — OXYCODONE HYDROCHLORIDE 2.5 MG: 5 TABLET ORAL at 10:19

## 2024-06-26 RX ADMIN — SEVELAMER CARBONATE 800 MG: 800 TABLET, FILM COATED ORAL at 14:35

## 2024-06-26 RX ADMIN — HEPARIN SODIUM 5000 UNITS: 5000 INJECTION, SOLUTION INTRAVENOUS; SUBCUTANEOUS at 05:26

## 2024-06-26 ASSESSMENT — COGNITIVE AND FUNCTIONAL STATUS - GENERAL
DAILY ACTIVITIY SCORE: 17
MOVING TO AND FROM BED TO CHAIR: A LOT
SUGGESTED CMS G CODE MODIFIER DAILY ACTIVITY: CK
STANDING UP FROM CHAIR USING ARMS: A LOT
PERSONAL GROOMING: A LITTLE
SUGGESTED CMS G CODE MODIFIER MOBILITY: CL
DRESSING REGULAR LOWER BODY CLOTHING: A LITTLE
SUGGESTED CMS G CODE MODIFIER MOBILITY: CH
HELP NEEDED FOR BATHING: A LITTLE
MOBILITY SCORE: 14
DRESSING REGULAR LOWER BODY CLOTHING: A LITTLE
HELP NEEDED FOR BATHING: A LITTLE
MOVING FROM LYING ON BACK TO SITTING ON SIDE OF FLAT BED: A LITTLE
TOILETING: A LOT
WALKING IN HOSPITAL ROOM: A LOT
SUGGESTED CMS G CODE MODIFIER DAILY ACTIVITY: CK
TOILETING: A LITTLE
CLIMB 3 TO 5 STEPS WITH RAILING: A LOT
TURNING FROM BACK TO SIDE WHILE IN FLAT BAD: A LITTLE
PERSONAL GROOMING: A LITTLE
MOBILITY SCORE: 24
DRESSING REGULAR UPPER BODY CLOTHING: A LITTLE
DAILY ACTIVITIY SCORE: 19
EATING MEALS: A LITTLE
DRESSING REGULAR UPPER BODY CLOTHING: A LITTLE

## 2024-06-26 ASSESSMENT — GAIT ASSESSMENTS
DISTANCE (FEET): 200
GAIT LEVEL OF ASSIST: SUPERVISED
ASSISTIVE DEVICE: FRONT WHEEL WALKER

## 2024-06-26 ASSESSMENT — ACTIVITIES OF DAILY LIVING (ADL): TOILETING: INDEPENDENT

## 2024-06-26 ASSESSMENT — PAIN DESCRIPTION - PAIN TYPE: TYPE: ACUTE PAIN

## 2024-06-26 NOTE — PROGRESS NOTES
Monitor Summary:     Rhythm: SR  Rate: 75-85  Ectopy: (O)PVC  Measurements: .16/.08/.35                 12 Hour Chart Check

## 2024-06-26 NOTE — DISCHARGE SUMMARY
Discharge Summary    CHIEF COMPLAINT ON ADMISSION  Chief Complaint   Patient presents with    Chest Pain       Reason for Admission  chest     Admission Date  6/23/2024    CODE STATUS  Full Code    HPI & HOSPITAL COURSE  Yasemin Villagran is a 63 y.o. 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 Patient states she has been to [facility] multiple times and has not felt any improvement after each discharge. Complaints of orthopnea, left sided chest pressure radiating to the left arm, neck, and back worse with inspiration.      patient had a normal left heart catheterization recently  CT of the chest (thorax) showed a new large pericardial effusion with increased density debris or blood products.   Cardiology consulted, subsequent  Echo showed moderate pericardial effusion without evidence of hemodynamic compromise; EF of 60%, RVSP 41.  no interventions indicated.  Her chest pain is more likely due to acute pericarditis.  Cardiology started and will continue prednisone 10 mg daily for a month; ibuprofen 600 mg 3 times daily for 2 weeks.  Patient is not a candidate for colchicine given ESRD; repeat echo in a month.      Patient had episodes of hypotension and blood pressure has been in the lower range.  Her home hypertension medications have been discontinued.  She was advised to follow-up with primary care doctor for further management.     Patient was noted to hyperkalemia with potassium 6.8. nephrology consulted the patient to receive HD.  Hyperkalemia resolved.  Potassium of 4.3 at discharge.     Patient does not want to go to postacute.  Home health arranged.     Therefore, she is discharged in good and stable condition to home with organized home healthcare and close outpatient follow-up.    The  patient met 2-midnight criteria for an inpatient stay at the time of discharge.    Discharge Date  6/26/2024    FOLLOW UP ITEMS POST DISCHARGE  - Follow up with primary care physician in 1 week.   - Follow up with nephrology for ESRD as instructed  - Follow up with cardiology as instructed  - Please take the medications as instructed    - Go to the local Emergency Department if you have any worsening condition.     DISCHARGE DIAGNOSES  Principal Problem:    Acute idiopathic pericarditis (POA: Unknown)  Active Problems:    Hypertension (POA: Yes)    End stage renal disease on dialysis (HCC) (POA: Yes)      Overview: ICD-10 transition    Chest pain (POA: Yes)    Pericardial effusion (POA: Unknown)    Pneumonia (POA: Unknown)    Hypotension (POA: Unknown)    Hyperkalemia (POA: Unknown)  Resolved Problems:    * No resolved hospital problems. *      FOLLOW UP  Future Appointments   Date Time Provider Department Center   7/17/2024 10:00 AM Michel JALLOH M.D. CC None     Pankaj Solorzano M.D.  [address] [id number] [phone]    Follow up in 1 week(s)  Please call your primary care provider to schedule, recent hospitalization follow up      MEDICATIONS ON DISCHARGE     Medication List        START taking these medications        Instructions   ibuprofen 600 MG Tabs  Commonly known as: Motrin   Take 1 Tablet by mouth every 8 hours for 14 days.  Dose: 600 mg     predniSONE 10 MG Tabs  Commonly known as: Deltasone   Take 1 Tablet by mouth every day for 30 days.  Dose: 10 mg            CONTINUE taking these medications        Instructions   acetaminophen 325 MG Tabs  Commonly known as: Tylenol   Take 650 mg by mouth every 6 hours as needed for Fever or Mild Pain.  Dose: 650 mg     aspirin 81 MG EC tablet   Take 81 mg by mouth every day.  Dose: 81 mg     atorvastatin 40 MG Tabs  Commonly known as: Lipitor   Take 40 mg by mouth at bedtime.  Dose: 40 mg     cyanocobalamin 100 MCG Tabs  Commonly known  as: Vitamin B-12   Take 100 mcg by mouth every day.  Dose: 100 mcg     MELATONIN PO   Take 1 Tablet by mouth at bedtime as needed.  Dose: 1 Tablet     omeprazole 40 MG delayed-release capsule  Commonly known as: PriLOSEC   Take 40 mg by mouth every day.  Dose: 40 mg     polyethylene glycol/lytes Pack  Commonly known as: Miralax   Take 17 g by mouth 1 time a day as needed.  Dose: 17 g     sevelamer 800 MG Tabs  Commonly known as: Renagel   Take 800 mg by mouth 3 times a day with meals. Indications: takes 3 tabs 3 times a day  Dose: 800 mg            STOP taking these medications      amLODIPine 10 MG Tabs  Commonly known as: Norvasc     amoxicillin-clavulanate 875-125 MG Tabs  Commonly known as: Augmentin     azithromycin 500 MG tablet  Commonly known as: Zithromax     cloNIDine 0.1 MG Tabs  Commonly known as: Catapres     losartan 25 MG Tabs  Commonly known as: Cozaar     metoprolol tartrate 25 MG Tabs  Commonly known as: Lopressor              Allergies  No Known Allergies    DIET  Orders Placed This Encounter   Procedures    Diet Order Diet: Renal     Standing Status:   Standing     Number of Occurrences:   1     Order Specific Question:   Diet:     Answer:   Renal [8]       ACTIVITY  As tolerated.  Weight bearing as tolerated    CONSULTATIONS      PROCEDURES      LABORATORY  Lab Results   Component Value Date    SODIUM 135 06/26/2024    POTASSIUM 4.3 06/26/2024    CHLORIDE 91 (L) 06/26/2024    CO2 27 06/26/2024    GLUCOSE 93 06/26/2024    BUN 28 (H) 06/26/2024    CREATININE 4.37 (HH) 06/26/2024    GLOMRATE 8 (L) 04/13/2023        Lab Results   Component Value Date    WBC 12.5 (H) 06/26/2024    HEMOGLOBIN 11.7 (L) 06/26/2024    HEMATOCRIT 37.5 06/26/2024    PLATELETCT 397 06/26/2024        Total time of the discharge process exceeds 34 minutes.

## 2024-06-26 NOTE — PROGRESS NOTES
Monitor summary:        Rhythm: Sinus rhythm  Rate: 74-81  Ectopy: (o)PVC  Measurements: .17/.07/.35        12hr chart check

## 2024-06-26 NOTE — THERAPY
Occupational Therapy   Initial Evaluation     Patient Name: Yasemin Villagran  Age:  63 y.o., Sex:  female  Medical Record #: [id number]  Today's Date: 6/26/2024     Precautions  Precautions: Fall Risk    Assessment    Patient is 63 y.o. female admitted with chest pain and L arm/neck pain, found to have pericardial effusion. 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 Pt seen for OT evaluation and treatment. Pt required supv to don/doff socks, don shoes, stand to wash hands, and perform toilet transfer including clothing management. Pt lives with her son who can assist as needed when he is home. Pt educated regarding the role of OT and recommendation for shower chair. Patient will not be actively followed for occupational therapy services at this time, however may be seen if requested by physician for 1 more visit within 30 days to address any discharge or equipment needs.      Plan    Occupational Therapy Initial Treatment Plan   Duration: Discharge Needs Only    DC Equipment Recommendations: None  Discharge Recommendations: Anticipate that the patient will have no further occupational therapy needs after discharge from the hospital      Objective     06/26/24 0952    Services   Is patient using  services for this encounter? Yes   Language Interpreted Malian    Name [id number] John, [id number] Iman    Mode iPad   Refusal signed by patient? No   Content of Interpretation (select all)   (OT evaluation)   Initial Contact Note    Initial Contact Note Order Received and Verified, Evaluation Only - Patient Does Not Require Further Acute Occupational Therapy at this Time.  However, May Benefit from Post Acute Therapy for Higher Level Functional Deficits.   Prior Living Situation   Prior Services None   Housing / Facility 1 Story House   Steps Into Home 8   Steps In Home 0   Rail Left Rail (Steps in Home)   Bathroom Set up  Bathtub / Shower Combination   Equipment Owned Front-Wheel Walker   Lives with - Patient's Self Care Capacity Adult Children   Comments Pt lives with her son who can assist as needed when home.   Prior Level of ADL Function   Self Feeding Independent   Grooming / Hygiene Independent   Bathing Independent   Dressing Independent   Toileting Independent   Prior Level of IADL Function   Medication Management Independent   Laundry Independent   Kitchen Mobility Independent   Finances Independent   Home Management Independent   Shopping Independent   Prior Level Of Mobility Independent Without Device in Community;Independent Without Device in Home   Driving / Transportation Driving Independent   Precautions   Precautions Fall Risk   Vitals   Pulse 84   Pulse Oximetry 96 %   O2 (LPM) 3   O2 Delivery Device Silicone Nasal Cannula   Vitals Comments BP checked throughout as follows: supine 106/56, /69, after activity 131/66. No dizziness reported   Pain   Pain Scales 0 to 10 Scale    Pain 0 - 10 Group   Therapist Pain Assessment Post Activity Pain Same as Prior to Activity;Nurse Notified  (not rated, agreeable to session)   Cognition    Cognition / Consciousness WDL   Level of Consciousness Alert   Comments Very pleasant and cooperative   Passive ROM Upper Body   Passive ROM Upper Body WDL   Active ROM Upper Body   Active ROM Upper Body  WDL   Strength Upper Body   Upper Body Strength  WDL   Sensation Upper Body   Upper Extremity Sensation  WDL   Upper Body Muscle Tone   Upper Body Muscle Tone  WDL   Coordination Upper Body   Coordination WDL   Balance Assessment   Sitting Balance (Static) Fair +   Sitting Balance (Dynamic) Fair +   Standing Balance (Static) Fair   Standing Balance (Dynamic) Fair   Weight Shift Sitting Fair   Weight Shift Standing Fair   Comments w/ FWW   Bed Mobility    Supine to Sit Supervised   Sit to Supine   (EOB w/ dialysis RN and PT post)   Scooting Supervised   Rolling Supervised   Comments no  use of bed features   ADL Assessment   Grooming Supervision;Standing  (washed hands)   Lower Body Dressing Supervision  (don/doff socks, don shoes)   Toileting   (toilet transfer only, including clothing management)   Functional Mobility   Sit to Stand Supervised   Bed, Chair, Wheelchair Transfer Supervised   Toilet Transfers Supervised   Transfer Method Stand Step   Mobility EOB>Bathroom>EOB   Comments w/ FWW   Visual Perception   Visual Perception  Not Tested   Activity Tolerance   Sitting in Chair NT   Sitting Edge of Bed EOB post   Standing >5 min   Comments functional   Education Group   Education Provided Activities of Daily Living;Role of Occupational Therapist;Adaptive Equipment   Role of Occupational Therapist Patient Response Patient;Acceptance;Explanation;Verbal Demonstration   ADL Patient Response Patient;Acceptance;Demonstration;Explanation;Action Demonstration;Verbal Demonstration   Adaptive Equipment Patient Response Patient;Acceptance;Explanation;Demonstration;Verbal Demonstration;Action Demonstration   Additional Comments rec for shower chair

## 2024-06-26 NOTE — CARE PLAN
The patient is Stable - Low risk of patient condition declining or worsening    Shift Goals  Clinical Goals: VSS, dialysis, pain management  Patient Goals: Pain control, comfort  Family Goals: EDWARD    Progress made toward(s) clinical / shift goals:    Problem: Pain - Standard  Goal: Alleviation of pain or a reduction in pain to the patient’s comfort goal  Outcome: Progressing     Problem: Knowledge Deficit - Standard  Goal: Patient and family/care givers will demonstrate understanding of plan of care, disease process/condition, diagnostic tests and medications  Outcome: Progressing     Problem: Communication  Goal: The ability to communicate needs accurately and effectively will improve  Outcome: Progressing     Problem: Hemodynamics  Goal: Patient's hemodynamics, fluid balance and neurologic status will be stable or improve  Outcome: Progressing     Problem: Respiratory  Goal: Patient will achieve/maintain optimum respiratory ventilation and gas exchange  Outcome: Progressing       Patient is not progressing towards the following goals:    Patient went to dialysis and had 2 L output. Patient updated on plan of care with no further questions

## 2024-06-26 NOTE — DISCHARGE INSTRUCTIONS
- Follow up with primary care physician in 1 week.   - Follow up with nephrology for ESRD as instructed  - Follow up with cardiology as instructed  - Please take the medications as instructed    - Go to the local Emergency Department if you have any worsening condition.

## 2024-06-26 NOTE — PROGRESS NOTES
[facility] -  PROGRESS  NOTE               Author: Manjinder Raphael M.D. Date & Time: 6/26/2024  12:43 PM     HISTORY OF PRESENT ILLNESS:    62 yo with known history of ESRD on HD at [facility], known pericardial effusion hypothyroidism, hypertension, dyslipidemia, who presented to ER on 6/23/2024 with complaints of worsening of left sided chest pain, worsening with change in position, deep inspiration, radiating into the left shoulder into the neck.      She was recently admitted to [facility] last week with similar symptoms.  She had extensive cardiac workup performed at that facility including stress test with evidence of ischemia. She underwent cardiac catheterization which showed normal coronary arteries with normal LVEDP.  Echocardiogram at that time showed moderate size pericardial effusion without evidence of tamponade. Results summarized below:   Per cardiology, recommended to continue hemodialysis for the pericardial effusion.   Cardiac Cath [facility] 6/20/2024:  Coronary Findings Diagnostic Dominance: Right   Left Main: The vessel was visualized by angiography, is moderate in size and is angiographically normal.   Left Anterior Descending: The vessel was visualized by angiography, is moderate in size and is angiographically normal.   Left Circumflex: The vessel was visualized by angiography, is moderate in size and is angiographically normal.   Right Coronary Artery: The vessel was visualized by angiography, is moderate in size and is angiographically normal.    Echo [facility] 6/19/2024:  Moderate size pericardial effusion noted posteriorly. RAP is 3 mm Hg. There is minimal increase in the size of the effusion with no evidence of cardiac tamponade noted when compared with previous echo. Normal LV size and function with EF of 60-65 %.   Dilated RV size and systolic function.    She experienced chest pain again yesterday and decided to come to this ER.  "  Upon evaluation in ER, she is hemodynamically stable on room air. Blood work showed troponin 224, 212, elevated procalcitonin 2.46, hypercalcemia with corrected calcium 11.6, WBC 12.9. X-ray of the left shoulder showed no acute abnormalities. Chest x-ray: Enlargement of cardiac silhouette. CT of the chest: Small bilateral pleural effusion, compressive atelectasis and consolidation of the lower lobe, left worse than the right, large pericardial effusion. Patient denies any fever or chills, no nausea, has some shortness of breath but no cough or phlegm. No melena, hematochezia, hematemesis.  No HA, visual changes, or abdominal pain.    NEPHROLOGY DAILY PROGRESS:   6/24: Consult note  6/25: HD yesterday and repeat HD today, started on NSAIDs and oral steroids yesterday for recurrent pericarditis and effusion  6/26: Pt seen and examined on HD, tolerating treatment well, hopeful of discharge today       PAST MEDICAL/SURGICAL/SOCIAL/FAMILY HISTORY:   Reviewed and remains unchanged from admission   HOME MEDICATIONS:   - Reviewed and documented in chart    LABORATORY STUDIES:   - Reviewed and documented in chart    ALLERGIES:  Patient has no known allergies.    VS:  BP 95/43 Comment: HD in progress  Pulse 75   Temp 36.5 °C (97.7 °F) (Temporal)   Resp 17   Ht 1.549 m (5' 1\")   Wt 46.2 kg (101 lb 13.6 oz)   SpO2 98%   BMI 19.24 kg/m²   Physical Exam  Constitutional:       Appearance: Normal appearance.   HENT:      Head: Normocephalic and atraumatic.      Right Ear: External ear normal.      Left Ear: External ear normal.      Nose: Nose normal.      Mouth/Throat:      Mouth: Mucous membranes are dry.   Eyes:      Extraocular Movements: Extraocular movements intact.      Pupils: Pupils are equal, round, and reactive to light.   Cardiovascular:      Rate and Rhythm: Normal rate.      Pulses: Normal pulses.   Pulmonary:      Effort: Pulmonary effort is normal. No respiratory distress.      Breath sounds: No stridor. " "  Abdominal:      Palpations: Abdomen is soft.   Musculoskeletal:      Cervical back: Neck supple.   Neurological:      Mental Status: She is alert.            FLUID BALANCE:  In: 940 [P.O.:440; Dialysis:500]  Out: 2500     LABS:  Recent Labs     06/23/24  2220 06/24/24  1254 06/25/24  0249 06/26/24  0305   SODIUM 134*  --  134* 135   POTASSIUM 5.5 6.8* 4.9 4.3   CHLORIDE 93*  --  93* 91*   CO2 21 -- 24 27   GLUCOSE 118*  --  153* 93   BUN 58*  --  32* 28*   CREATININE 9.54*  --  5.29* 4.37*   CALCIUM 11.5*  --  10.7* 11.0*        IMAGING:  - All imaging reviewed from admission to present day    CT-CHEST (THORAX) W/O   Final Result       1.  There is a large pericardial effusion which now demonstrates increased density suggesting debris or blood products which is new since the prior study of yesterday.   2.  Increasing bilateral airspace disease left greater than right could represent pneumonitis or atelectasis.   3.  Trace amount of dependent pleural effusion.   4.  New dilated thoracic esophagus containing internal debris, fluid and air with no gross evidence of mediastinal air.       Fleischner Society pulmonary nodule recommendations:            Echo 6/24/2024:  Normal left ventricular chamber size. Normal left ventricular wall   thickness. Normal left ventricular systolic function. The left   ventricular ejection fraction is visually estimated to be 60-65%.   Normal regional wall motion. Grade II diastolic dysfunction.  Severely dilated left atrium  Aortic valve sclerosis without significant stenosis  Mild tricuspid regurgitation with estimated RV systolic pressure of 41 mmHg.  Moderate pericardial effusion without evidence of hemodynamic left pleural effusion present.      IMPRESSION:  # ESRD   - q MWF at [facility]   # Acute on chronic Pericarditis    - Cardiology eval appreciated, no evidence of tamponade   Cardiology recommends \"initiation of anti-inflammatory therapies. Although not first-line, " reasonable to use prednisone for symptom relief.   # HTN  - Goal BP < 140/90  - At goal  # Anemia of CKD  - Goal Hgb 10-11  - At goal  # CKD-MBD      PLAN:  - Repeat HD today for 3 hrs (daily HD x 3 days)   - Cont HD q MWF   - Started on NSAID's - Ibuprofen 600 mg PO TID and continue on discharge   - Started on Prednisone 10 mg PO daily x 4 weeks then 5 mg PO daily x 4 weeks then taper to off   - Continue PPI for GERD Prophylaxis - already on Prilosec 40 mg - continue on discharge - increase to 40 BID if GERD symptoms worsen because of NSAIDs  - UF as tolerated  - No dietary protein restrictions  - Dose all meds per ESRD    OK to discharge from Renal standpoint, will continue outpatient HD in [place] on discharge       Manjinder Raphael MD, MS, FASN, FACP, GREGOR   [facility]   [phone]

## 2024-06-26 NOTE — THERAPY
Physical Therapy   Initial Evaluation     Patient Name: Yasemin Villagran  Age:  63 y.o., Sex:  female  Medical Record #: [id number]  Today's Date: 6/26/2024     Precautions  Precautions: Fall Risk    Assessment  Patient is 63 y.o. female with a diagnosis of acute idiopathic pericarditis with past medical history of end-stage renal disease on hemodialysis Monday Wednesday Friday, hypothyroidism, hypertension, dyslipidemia, pericardial effusion without tamponade, who presented 6/23/2024 with complaints of worsening of left sided chest pain, worsening with change in position, deep inspiration, radiating into the left shoulder into the neck. .  Additional factors influencing patient status / progress : today, pt is supervised for OOB, transfers and ambulation, SBA on stairs, tolerating ambulation x 200 feet and 13 stairs. Pt lives with her son who can assist when home from work as needed. Pt reports driving herself to and from dialysis at baseline. See below for details.       Plan         DC Equipment Recommendations: None  Discharge Recommendations: Anticipate that the patient will have no further physical therapy needs after discharge from the hospital Patient will not be actively followed for physical therapy services at this time, however may be seen if requested by physician for 1 more visit within 30 days to address any discharge or equipment needs.             Objective       06/26/24 [facility]   Charge Group   PT Evaluation PT Evaluation Low   PT Self Care / Home Evaluation (Units) 1   Total Time Spent   PT Total Time Yes   PT Evaluation Time Spent (Mins) 15   PT Self Care/Home Evaluation Time Spent (Mins) 8   PT Total Time Spent (Calculated) 23    Services   Is patient using  services for this encounter? Yes   Language Interpreted Estonian    Name [id number] [facility]    Mode iPad   Content of Interpretation (select all) History/Visit information   Initial Contact Note     Initial Contact Note Order Received and Verified, Evaluation Only - Patient Does Not Require Further Acute Physical Therapy at this Time.  However, May Benefit from Post Acute Therapy for Higher Level Functional Deficits.   Precautions   Precautions Fall Risk   Vitals   Patient BP Position Sandoval's Position  (back to bed after ambulation and stairs.)   Blood Pressure 118/67  (100/56 EOB before PT)   O2 (LPM) 3   O2 Delivery Device Silicone Nasal Cannula   Pain 0 - 10 Group   Therapist Pain Assessment 0;During Activity;Nurse Notified   Prior Living Situation   Prior Services None   Housing / Facility 1 Story House   Steps Into Home 8   Steps In Home 0   Rail Left Rail (Steps in Home)   Equipment Owned Front-Wheel Walker   Lives with - Patient's Self Care Capacity Adult Children   Comments son works, but assists as needed when home.   Prior Level of Functional Mobility   Bed Mobility Independent   Transfer Status Independent   Ambulation Independent   Ambulation Distance community, drives herself to/from dialysis   Assistive Devices Used None   Stairs Independent   Cognition    Cognition / Consciousness WDL   Level of Consciousness Alert   Active ROM Lower Body    Active ROM Lower Body  WDL   Strength Lower Body   Lower Body Strength  WDL   Balance Assessment   Sitting Balance (Static) Fair +   Sitting Balance (Dynamic) Fair +   Standing Balance (Static) Fair   Standing Balance (Dynamic) Fair   Weight Shift Sitting Good   Weight Shift Standing Good   Comments with FWW   Bed Mobility    Supine to Sit   (pt is met up at EOB)   Sit to Supine Supervised   Scooting Supervised   Rolling Supervised   Gait Analysis   Gait Level Of Assist Supervised   Assistive Device Front Wheel Walker   Distance (Feet) 200   # of Times Distance was Traveled 1   # of Stairs Climbed 13   Level of Assist with Stairs Standby Assist   Weight Bearing Status no restrictions   Functional Mobility   Sit to Stand Supervised   Bed, Chair, Wheelchair  Transfer Supervised   Transfer Method Stand Step   6 Clicks Assessment - How much HELP from from another person do you currently need... (If the patient hasn't done an activity recently, how much help from another person do you think he/she would need if he/she tried?)   Turning from your back to your side while in a flat bed without using bedrails? 4   Moving from lying on your back to sitting on the side of a flat bed without using bedrails? 4   Moving to and from a bed to a chair (including a wheelchair)? 4   Standing up from a chair using your arms (e.g., wheelchair, or bedside chair)? 4   Walking in hospital room? 4   Climbing 3-5 steps with a railing? 4   6 clicks Mobility Score 24   Activity Tolerance   Standing 5+   Education Group   Education Provided Role of Physical Therapist   Role of Physical Therapist Patient Response Patient;Acceptance;Explanation;Verbal Demonstration   Additional Comments pt agreeable to using FWW if feels need at home.   Anticipated Discharge Equipment and Recommendations   DC Equipment Recommendations None   Discharge Recommendations Anticipate that the patient will have no further physical therapy needs after discharge from the hospital   Interdisciplinary Plan of Care Collaboration   IDT Collaboration with  Nursing   Patient Position at End of Therapy In Bed;Call Light within Reach;Tray Table within Reach;Phone within Reach   Collaboration Comments in bed, starting dialysis after PT   Session Information   Date / Session Number  6/26-1x only, dc needs only

## 2024-06-26 NOTE — PROGRESS NOTES
[facility] Services Progress Notes     Hemodialysis treatment x 3 hours completed as ordered per Dr. Raphael.  Treatment performed at bedside started at 1035 and ended at 1336.       Net UF Removed:  -300 mL (additional NS fluids given due to low/soft BPs)     Dialysis Input: 800 mL (200 mL NS prime + 300 mL NS bolus+ 300 mL NS rinseback)  Dialysis Output: 500 mL     Hemodialysis treatment orders and patient labs reviewed.  Plan of treatment explained, verbalized understanding, consent for treatment verified.  Patient and access assessed pre and post treatment.  Patient tolerated treatment with soft BPs/hypotensive episodes intra-treatment improved with decreased UF and NS bolus as needed.  Patient remained asymptomatic throughout treatment.  UF adjusted as blood pressure tolerates.  Seen and examined by Dr. Raphael during treatment.  See dialysis electronic flow sheets under media for details.     Post tx access: 15 g cannulation needles removed from LUE upper arm AVF access sites, hemostasis established on each insertion site within 5 minutes each; verified no bleeding. (+) bruit/thrill post treatment. Covered with clean gauze dressings and secured with tape.     Please monitor for LUE upper AVF access bleeding. Should any breakthrough bleeding occur, please apply gauze and firm pressure on site until bleeding resolves then cover with gauze dressing and tape. Please observe LUE precautions- NO taking of BPs or blood draws to LUE.     Please notify Nephrologist/Dialysis for follow-up.     Report given to primary care nurse MARLENI Childress RN.

## 2024-06-26 NOTE — PROGRESS NOTES
Bedside report received and patient care assumed. Pt is resting in bed, A&O4, with no complaints of pain, and is on 3 L NC. Tele box on. All fall precautions are in place, belongings at bedside table.  Pt was updated on POC, no questions or concerns. Pt educated on use of call light for assistance.

## 2024-10-10 ENCOUNTER — PATIENT MESSAGE (OUTPATIENT)
Dept: HEALTH INFORMATION MANAGEMENT | Facility: OTHER | Age: 64
End: 2024-10-10

## 2025-02-12 ENCOUNTER — TELEPHONE (OUTPATIENT)
Facility: MEDICAL CENTER | Age: 65
End: 2025-02-12
Payer: COMMERCIAL

## 2025-02-12 ENCOUNTER — DOCUMENTATION (OUTPATIENT)
Facility: MEDICAL CENTER | Age: 65
End: 2025-02-12
Payer: COMMERCIAL

## 2025-02-12 NOTE — TELEPHONE ENCOUNTER
"[facility]  Kidney Transplant Program- Referral Review    Patient Information  Patient Name  Yasemin Villagran   Date of Birth  1960   MRN  [id number]   [place] Citizenship Yes   BMI  Estimated body mass index is 19.24 kg/m² as calculated from the following:    Height as of 6/24/24: 1.549 m (5' 1\").    Weight as of 6/25/24: 46.2 kg (101 lb 13.6 oz).     Referral Information  Dialysis   Yes   [facility] M[facility]     Did patient provide social security number?  Yes    Is an  needed: Yes,  Greenlandic    Ambulatory assistance needed: No     What hospital or medical [facility] is most care received?  [facility]     Have you had any recent hospitalizations? Yes   Hospital [facility]   When: November 2024- stomach issue     Specialty Providers  Do you have a PCP? Yes   Name: States she does not remember the provider's name, but sees them at [facility]  Do you have a cardiologist or heart doctor? Yes   Name: Sallie Wallace  Do you have a urologist? No    Name:  Do you have any other specialty doctors? Yes   Names(s): States she has a rheumatologist for arthritis at [facility], doesn't remember provider's name  Have you had a colonoscopy? Yes   When: 2024   Where: [facility]    Provider information will be added to care team and most recent notes and testing will be requested.     Transplant Information:  Evaluated or declined at any other transplant programs? No  If so, which center:   Potential living donors? No   Previously received a transplant? No   Organ: N/A  Where:  When:    Records will be requested and sent to RN coordinator for further review and plan. Patient encouraged to contact us with any questions or medical updates in the interm.     At this time, does [facility] have verbal consent from you, Yasemin Villagran, to request records from external facilities for your consult/evaluation? Yes  If patient stated no, we " advised that the patient is required to obtain their records and bring them in with them to their first appointment.       Electronically Signed by   Shaunna Edgar  2/12/2025 9:01 AM

## 2025-02-12 NOTE — PROGRESS NOTES
"Transplant Coordinator   Kidney Transplant Referral Review    Date of Review: 2025    Patient Information  Patient Name Yasemin Villagran   MRN [id number]    / Age  1960 / 64 y.o.   Sex Female    Race White [7]    Ethnicity   Origin (English,Salvadorean,Burkinan,Barbadian, etc) [2]    Preferred Language / Is an  Required?  English / Yes   Current Ambulatory Status Independent     Referral Information   Referral Date 2025   Referring Nephrologist  Dr. PING Dietrich   Dialysis Facility [facility]   Dialysis Modality  ICHD   Dialysis Schedule MWF   Primary Cause of ESRD Hypertensive Nephrosclerosis    Chronic Dialysis Start Date  2012     Patient Care Team  PCP: Yes, Name: Alec Wasserman MD  Cardiologist: Yes, Name: Brii Magallon MD  Gastroenterology:Eldon Chang MD  Rheumatology: Fiona Koch MD    Transplant History & Status Overview    Prior Transplant(s): No   Currently Listed or Evaluated Elsewhere? Yes   If Applicable, List the Transplant Center(s): [facility]  Referred 2020  Evaluation 2021  Committee 2024  Marked as Active 2021   Previously Declined by Another Transplant Center? Yes   If Applicable, List the Transplant Center(s) and Reason(s) for Decline (if available): [facility]  Referred 2016   Evaluation 2021  Committee 2024  Marked as Ineligible 2016   Reason: Does Not Meet Criteria   Potential Living Donors? No  Blood Type: A Positive     Medical History & Current Status    Estimated body mass index is 19.24 kg/m² as calculated from the following:    Height as of 24: 1.549 m (5' 1\").    Weight as of 24: 46.2 kg (101 lb 13.6 oz).     Current Outpatient Medications:     aspirin 81 MG EC tablet, Take 81 mg by mouth every day., Disp: , Rfl:     atorvastatin (LIPITOR) 40 MG Tab, Take 40 mg by mouth at bedtime., Disp: , Rfl:     omeprazole (PRILOSEC) 40 MG delayed-release capsule, Take 40 mg by mouth " every day. (Patient not taking: Reported on 8/5/2024), Disp: , Rfl:     polyethylene glycol/lytes (MIRALAX) Pack, Take 17 g by mouth 1 time a day as needed., Disp: , Rfl:     cyanocobalamin (VITAMIN B-12) 100 MCG Tab, Take 100 mcg by mouth every day., Disp: , Rfl:     MELATONIN PO, Take 1 Tablet by mouth at bedtime as needed. (Patient not taking: Reported on 8/5/2024), Disp: , Rfl:     acetaminophen (TYLENOL) 325 MG Tab, Take 650 mg by mouth every 6 hours as needed for Fever or Mild Pain., Disp: , Rfl:     sevelamer (RENAGEL) 800 MG Tab, Take 800 mg by mouth 3 times a day with meals. Indications: takes 3 tabs 3 times a day, Disp: , Rfl:      No Known Allergies     Social History     Tobacco Use    Smoking status: Never    Smokeless tobacco: Never   Vaping Use    Vaping status: Never Used   Substance Use Topics    Alcohol use: No     Alcohol/week: 0.0 oz    Drug use: No   If a history of smoking is present, document pack-year history (# packs/day × years smoked): N/A    Family History   Problem Relation Age of Onset    Diabetes Father     Lung Disease Sister     No Known Problems Brother        Past Surgical History:   Procedure Laterality Date    AV FISTULA CREATION Left 12/26/2022    Procedure: LEFT BRACHIOCEPHALIC ARTERIOVENOUS FISTULA CREATION.;  Surgeon: Mauro Saavedra M.D.;  Location: SURGERY [facility];  Service: General    COLONOSCOPY - ENDO  12/20/2016    Procedure: COLONOSCOPY - ENDO;  Surgeon: Fredy Gustafson M.D.;  Location: SURGERY [facility];  Service:     AV FISTULA THROMBOLYSIS        Patient Active Problem List   Diagnosis    Hypertension    End stage renal disease on dialysis (HCC)    Family history of diabetes mellitus    Lukasz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)    Chest pain    Pericardial effusion    Pneumonia    Acute idiopathic pericarditis    Hypotension    Hyperkalemia      Past Medical History:   Diagnosis Date    Anemia 12/08/2022    history of    Anesthesia 12/08/2022    PONV,  pt with history of motion sickness    Breath shortness 12/08/2022    with exertion since 2021    Dental disorder 12/08/2022    full plate upper and partial lower    Dialysis patient (HCC) 12/08/2022 monday, Wednesday, Friday    Disorder of thyroid 12/08/2022    following up with MD, not medicate    Essential hypertension 12/08/2022    medicated    Heart burn 12/08/2022    medicated    High cholesterol 12/08/2022    medicated    Indigestion 12/08/2022    medicated    Kidney disease 12/08/2022    end stage    Pain 12/08/2022    left shoulder and neck with tingling and    Pneumonia 12/08/2022    history of    PONV (postoperative nausea and vomiting) 12/08/2022    history of motion sickness    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    Snoring 12/08/2022    Urinary tract infection, site not specified      Additional History    Cardiac History: Yes, Details: Diastolic CHF  Bleeding/Clotting Disorders: None Noted During Initial Chart Review  History of Stroke or TIAs: None Noted During Initial Chart Review  Anticoagulants: None Noted During Initial Chart Review  History of Malignancy: None Noted During Initial Chart Review  Diabetes Status: N/A  Recent Infections (Active/Resolved): None Noted During Initial Chart Review  Cognitive Status Concerns (if applicable): None Noted During Initial Chart Review  Other Pertinent History: Yes-See Below  Scleroderma (positive SERGIO >1:1280 centromere pattern, positive centromere >8; inflammatory arthritis, calcinosis, sclerodactyly, telangiectasis, GERD, gastroparesis, Raynaud's)  Pulmonary HTN  Smoke Inhalation Injury 2021  Current Dialysis Access (if applicable): L AVF    Sensitizing Events    History of Blood Transfusion(s): Yes, list date(s) if available: 2021, 2024  Pregnancies (if applicable): Yes-2     Recent Hospitalization(s)    11/11/24-ED  Cardiomegaly, HTN  11/08/24-ED  Umbilical hernia without obstruction and without gangrene    09/20/24-ED  Lower GI  Bleed  08/03/24-ED  Gastrointestinal hemorrhage associated with duodenitis   06/23/24-06/26/24-Admit  Acute idiopathic pericarditis   06/18/24-06/21/24-Admit  Hyperkalemia, hyponatremia, pleural effusions, fluid volume overload  Abnormal stress test that was a false positive with normal left heart catheterization  Macrocytic anemia   06/11/24-06/16/24-Admit  Community-acquired pneumonia   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 junction- status post IR guided needle biopsy on 06/14 12/14/23-12/17/23-Admit  Hyperkalemia, ESRD, Fluid Volume overload, hypertensive heart disease, Lukasz Hunt syndrome, PN, CAD, lower GI bleed, acute respiratory failure, acute on chronic diastolic CHF    03/03/23  Lower GI Bleed  07/25/22-ED  AVF Bleeding  08/09//12/21-Admit  Pneumonia  07/25/21-08/01/21-Admit  Chest pain, ESRD, acute on chronic diastolic CHF, fluid volume overload, HTN  11/20/20-ED  Missed Dialysis  09/26/17-ED  Lukasz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)   09/12/17-ED  Thrush  Multiple ED visits for GI upset     Diagnostic Studies Review  Test/Procedure Study Date Study Location Key Findings   Kidney Function      Latest eGFR 06/26/24 [facility]   Cardiac Testing      EKG 06/23/24 [facility]    [facility]. There is a small size, moderate perfusion defect noted   in the basal portion of anterolateral wall at rest images which worsens to   include basal to midportion of anterolateral wall with stress images   suggestive of infarction with moderate size evelina-infarct ischemia.     There is a moderate size, moderate perfusion defect noted in the basal to   mid portion of inferior wall at rest images which worsens with stress   images suggestive of infarction with moderate size evelina-infarct ischemia.     This likely suggestive of multivessel disease.     Left ventricular perfusion is abnormal.     Normal left ventricle function  (59%).     Significant extracardiac radiotracer uptake noted.     No stress ECG evidence of ischemia.     No recovery ECG evidence of ischemia.     In comparison with prior study dated perfusion defects appear to be   worse, with new ischemia present.     In comparison with prior study, the left ventricle ejection fraction   has decreased.    Echocardiogram 09/24/24 [facility] There is mild-moderate concentric left ventricular hypertrophy. Left ventricular systolic function   is normal. The Ejection Fraction estimate is 55-60%     The atrial septum is aneurysmal. There is no Doppler evidence for an interatrial shunt     There is mild mitral regurgitation     Right ventricular systolic pressure is elevated at 30-35 mmHg    Imaging      CXR 01/22/25 [facility]    CT-A/P  1/11/24 [facility] Advanced aortoiliac vascular calcifications    CT-A/P w/ 04/13/23 [facility]    CT-Chest (if applicable for lung nodules/TB clearance/smoking Hx) 10/29/24 [facility]  Severe atherosclerosis and coronary artery disease.      Renal U/S 01/21/25 [facility]    Abdominal U/S 05/25/22 [facility]    CT-PETCT-MELANOMA-NON COVRD INDICAT 12/17/24 [facility] No evidence of hypermetabolic lymphoma.     Hypermetabolism associated with degenerative/posttraumatic arthropathy at   the left sternoclavicular joint and adjacent costochondral left upper rib   junctions.    Cancer Screening      Colonoscopy 03/06/23 [facility] Path report and repeat exam timeframe records needed    Mammogram 01/16/25 [facility] Recommend Q1 Year   F: Pap smear w/ HPV Screen 07/18/17 [facility] NEGATIVE FOR INTRAEPITHELIAL LESION AND MALIGNANCY.    M: PSA        Plan & Next Steps    Referral Review Outcome:  The patient has been referred for a pre-transplant evaluation for kidney transplantation.    Next Steps:   Preliminary chart review findings will be discussed with the multidisciplinary transplant team.  A final review of the referral information will  be completed.  If appropriate, the patient will be scheduled for an intake evaluation at the [facility] [place], pending insurance authorization.     Yumiko Jones R.N.  Transplant Coordinator  [facility] [place]

## 2025-02-13 ENCOUNTER — TELEPHONE (OUTPATIENT)
Facility: MEDICAL CENTER | Age: 65
End: 2025-02-13
Payer: COMMERCIAL

## 2025-02-13 NOTE — TELEPHONE ENCOUNTER
HTH (Primary)/[employer] (secondary), FC didn't need to educate pt about Medicare as pt is not on dialysis. FC moving pt forward to scheduling.    JOEL for [facility]

## 2025-03-03 RX ORDER — ASCORBIC ACID, THIAMINE MONONITRATE,RIBOFLAVIN, NIACINAMIDE, PYRIDOXINE HYDROCHLORIDE, FOLIC ACID, CYANOCOBALAMIN, BIOTIN, CALCIUM PANTOTHENATE, 100; 1.5; 1.7; 20; 10; 1; 6000; 150000; 5 MG/1; MG/1; MG/1; MG/1; MG/1; MG/1; UG/1; UG/1; MG/1
1 CAPSULE, LIQUID FILLED ORAL DAILY
COMMUNITY
Start: 2025-01-20

## 2025-03-03 RX ORDER — AMLODIPINE BESYLATE 10 MG/1
10 TABLET ORAL DAILY
COMMUNITY
Start: 2025-02-07

## 2025-03-03 RX ORDER — PREDNISONE 5 MG/1
TABLET ORAL
COMMUNITY
Start: 2024-11-15 | End: 2025-03-04

## 2025-03-03 RX ORDER — LOSARTAN POTASSIUM 25 MG/1
25 TABLET ORAL DAILY
COMMUNITY
Start: 2024-12-26

## 2025-03-03 RX ORDER — CLONIDINE HYDROCHLORIDE 0.1 MG/1
0.1 TABLET ORAL DAILY
COMMUNITY
Start: 2024-09-27

## 2025-03-03 NOTE — PROGRESS NOTES
PHARMACY PRE-TRANSPLANT EVALUATION    Yasemin Villagran (MRN: [id number])  March 4, 2025     Yasemin Villagran is a 64 y.o. female undergoing evaluation for transplant.      They were seen in clinic today and a pre-transplant medication education was completed. Patient was not accompanied by family during the education and family is not involved in managing the patients care. The patient did require  services (virtual translation services used). Post-transplant medications were reviewed, including financial impact and importance of medication compliance. Discussed that a full benefits investigation will be complete at the time of transplant and financial assistance will be investigated if needed. The patient/family had the opportunity to ask questions and endorsed understanding of counseling. They were instructed to contact the transplant team if any additional questions should arise. Will reinforce concepts following transplant.    No Known Allergies     Past Medical History:   Diagnosis Date    Anemia 12/08/2022    history of    Anesthesia 12/08/2022    PONV, pt with history of motion sickness    Breath shortness 12/08/2022    with exertion since 2021    Dental disorder 12/08/2022    full plate upper and partial lower    Dialysis patient (HCC) 12/08/2022 monday, Wednesday, Friday    Disorder of thyroid 12/08/2022    following up with MD, not medicate    Essential hypertension 12/08/2022    medicated    Heart burn 12/08/2022    medicated    High cholesterol 12/08/2022    medicated    Indigestion 12/08/2022    medicated    Kidney disease 12/08/2022    end stage    Pain 12/08/2022    left shoulder and neck with tingling and    Pneumonia 12/08/2022    history of    PONV (postoperative nausea and vomiting) 12/08/2022    history of motion sickness    [place]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) 10/03/2017    Snoring 12/08/2022    Urinary tract infection, site not specified        Social  History     Socioeconomic History    Marital status: Single     Spouse name: Not on file    Number of children: 1    Years of education: 0    Highest education level: Not on file   Occupational History    Not on file   Tobacco Use    Smoking status: Never    Smokeless tobacco: Never   Vaping Use    Vaping status: Never Used   Substance and Sexual Activity    Alcohol use: No     Alcohol/week: 0.0 oz    Drug use: No    Sexual activity: Not Currently   Other Topics Concern     Service No    Blood Transfusions Yes     Comment: 4yrs ago when they started doing dialysis    Caffeine Concern No    Occupational Exposure No    Hobby Hazards No    Sleep Concern No    Stress Concern No    Weight Concern No    Special Diet Yes     Comment: no salts or processed food     Back Care Not Asked    Exercise No    Bike Helmet Not Asked    Seat Belt Yes    Self-Exams No   Social History Narrative    Not on file     Social Drivers of Health     Financial Resource Strain: Not on File (4/29/2024)    Received from [facility]    Financial Resource Strain     Financial Resource Strain: 0   Food Insecurity: Not on File (9/26/2024)    Received from [facility]    Food Insecurity     Food: 0   Transportation Needs: Low Risk  (8/2/2024)    [facility] Transportation Needs     In the last month, have you not seen a doctor because you didn't have a way to get to the clinic or hospital?: No   Physical Activity: Not on File (4/29/2024)    Received from [facility]    Physical Activity     Physical Activity: 0   Stress: Not on File (4/29/2024)    Received from [facility]    Stress     Stress: 0   Social Connections: Not on File (9/20/2024)    Received from [facility]    Social Connections     Connectedness: 0   Intimate Partner Violence: Not At Risk (6/24/2024)    Humiliation, Afraid, Rape, and Kick questionnaire     Fear of Current or Ex-Partner: No     Emotionally Abused: No      Physically Abused: No     Sexually Abused: No   Housing Stability: Low Risk  (8/2/2024)    [facility] Housing Needs     In the last month, was there a time when you were not able to pay your mortgage or rent?: No     In the last month, have you slept outside, in a shelter, in a car, or any place not meant for sleeping?: Not on file        Immunization History   Administered Date(s) Administered    Hepatitis B Vaccine (Adol/Adult) 03/14/2018    Influenza Vac Subunit Quad Inj (Pf) 09/19/2018, 10/18/2019    MMR Vaccine 06/19/2019, 07/24/2019    Pneumococcal Conjugate Vaccine (Prevnar/PCV-13) 04/18/2017, 09/21/2017    Pneumococcal polysaccharide vaccine (PPSV-23) 09/11/2018    Tdap Vaccine 06/19/2019    Tuberculin Skin Test 04/26/2019    Zoster Vaccine Recombinant (RZV) (SHINGRIX) 09/11/2018       Current Outpatient Medications   Medication Sig Refill Last Dispense    acetaminophen (TYLENOL) 325 MG Tab Take 650 mg by mouth every 6 hours as needed for Fever or Mild Pain.  Unknown (patient-reported)    amLODIPine (NORVASC) 10 MG Tab Take 10 mg by mouth every day.  Unknown (patient-reported)    atorvastatin (LIPITOR) 40 MG Tab Take 40 mg by mouth at bedtime.  Unknown (patient-reported)    B Complex-C-Folic Acid (FOLIC ACID 1 MG-VIT B COMPLEX) 1 MG Cap Take 1 Capsule by mouth every day.  Unknown (patient-reported)    cloNIDine (CATAPRES) 0.1 MG Tab Take 0.1 mg by mouth every day.  Unknown (patient-reported)    cyanocobalamin (VITAMIN B-12) 100 MCG Tab Take 100 mcg by mouth every day.  Unknown (patient-reported)    injection RSV Pre-Fusion F A&B Vac Rcmb (ABRYSVO) 120 MCG/0.5ML Recon Soln Inject 0.5 mL into the shoulder, thigh, or buttocks one time for 1 dose. 0 Unknown (outside pharmacy)    losartan (COZAAR) 25 MG Tab Take 25 mg by mouth every day.  Unknown (patient-reported)    MELATONIN PO Take 1 Tablet by mouth at bedtime as needed.  Unknown  (patient-reported)    Omega-3 Fatty Acids (OMEGA 3 500 PO) Take 1 Capsule by mouth every day.  Unknown (patient-reported)    omeprazole (PRILOSEC) 40 MG delayed-release capsule Take 40 mg by mouth every day.  Unknown (patient-reported)    polyethylene glycol/lytes (MIRALAX) Pack Take 17 g by mouth 1 time a day as needed.  Unknown (patient-reported)    sevelamer (RENAGEL) 800 MG Tab Take 800 mg by mouth 3 times a day with meals. Taking 2 tablets with each meal and 1 with each snack    Indications: takes 3 tabs 3 times a day  Unknown (patient-reported)    Zoster Vac Recomb Adjuvanted (SHINGRIX) 50 MCG/0.5ML Recon Susp Inject 0.5 mL into the shoulder, thigh, or buttocks one time for 1 dose. 0 Unknown (outside pharmacy)        Assessment:  Use of herbal products or dietary supplements or over-the-counter medications:   Patient reports herbal teas, including some green and camomile tea. Interactions with transplant medications were reviewed and patient endorses understanding that all herbal teas must be reviewed with the transplant pharmacist prior to use post-transplant.   Use of marijuana or CBD products:   None  Discussed avoidance post-transplant due to concerns for potential interactions with immunosuppressant medications and increased risk of infection   Review of social history:   Non-smoker without history of smoking in the past  Reports no use of alcohol (sober)   Past immunosuppression:   None  History of transplant:   None  History of splenectomy:   None  Issues with drug absorption and/or swallowing large tablets/capsules:   Significant history of gastroparesis, erosive gastritis, esophageal dysphagia, and multiple GI bleeds requiring hospitalization. Reports previous treatment for H pylori without improvement of symptoms. Patient will need GI clearance prior to listing for transplant.   Barriers to medication adherence:   None  Reports missing approximately 10-20% of doses on a weekly basis with current  medication adherence techniques  Currently does use a pill box  Medications were reconciled with the patient during today's visit  Recommended the following to increase medication adherence before and after transplantation:   Provided information about pill organizers, Med Minder, phone alarms, calendars, and/or Transplant Medication Plans   and Discussed pharmacy home delivery options. Currently uses [employer] reminders.   Review of past infections:   MRSA - None  VRE - None  Other multidrug resistant infection - None  Hepatitis B - None (cAb -, sAb +, sAg - as of 6/24/24)  Hepatitis C - None (Ab - as of 6/24/24)  TB - None  CMV viremia - None  BK viremia/viuria - None  HIV - None  UTIs -  Reports remote history of UTIs and history of yeast infections  Other significant or recurrent infections - H pylori infection with ongoing epigastric pain.   Vaccinations:   Immunization history was reviewed and was updated during today's visit  The importance of vaccinations prior to and after transplant was discussed with the patient/family  Current or history of malignancy of any organ system, treated or untreated, within the past 5 years:   None  Controlled substance use:   None  Anticoagulation:   None  Drug-drug interactions likely to affect the post-transplant regimen:   None  Allergies updated and may require the following changes to transplant protocol medications:   None  Past participation in clinical studies:   None  Patient is a cis-gender female or transgender male (FTM) and is NOT of child-bearing potential due to post-menopausal x 10 years.  Patient was educated on an understands that some immunosuppressive medications commonly used in transplant are NOT recommended in pregnancy, including mycophenolate. Mycophenolate use during pregnancy is associated with increased risks of first trimester pregnancy loss and congenital malformations (for both male and female partners taking mycophenolate). The patient has been  counseled on these risks and understands that effective contraceptive use is recommended while on therapy and for a minimum of 6 to 12 weeks following discontinuation of mycophenolate. If the patient plans to have children, they understand they must discuss these plans with their transplant team in advance to consider alternative medications. If the patient or a partner of the patient becomes pregnancy unexpectedly, they understand to notify the transplant team immediately to consider alternative medications and report the pregnancy to the Mycophenolate Pregnancy Registry.   Other issues identified:   Unreliable transportation which may effect ability to get to hospital for transplant and/or follow-up appointments. Will defer social support services and follow-up care planning to transplant LSW.  Patient was provided with a medication education, reviewing the common medications used in kidney transplant. This education included medication indications, dosing, and side effects. Patient will receive a full medication education prior to discharge following transplant. All questions regarding medications were answered at this time.    Plan:  1. No pharmacological contraindications to transplant identified.  2. Recommend patient be up to date on all vaccines prior to transplant. Prescriptions for RSV and Shingrix vaccines sent to local pharmacy at patient request per Transplant Pharmacist CPA.   COVID-19 Primary Series - Patient reports receiving x 5 doses, unable to view in current records.   RSV vaccine (x 1 dose)  Shingrix for shingles (Dose 2 of 2)  3. I anticipate this patient will receive per protocol immunosuppression post-transplant.   4. Plan will be reviewed and additional changes will be made prior to transplant as needed  5. Patient will need GI clearance prior to listing due to concern with past GI bleeds and gastroparesis which may effect medication absorption or tolerability    This evaluation is valid for  a minimum of 12 months and this patient will not be re-evaluated by pharmacy unless consulted by another member of the multidisciplinary team.     Corin Garcia, Jesús   Solid Organ Transplant Pharmacist  3/4/2025

## 2025-03-04 ENCOUNTER — NON-PROVIDER VISIT (OUTPATIENT)
Facility: MEDICAL CENTER | Age: 65
End: 2025-03-04
Payer: MEDICARE

## 2025-03-04 ENCOUNTER — OFFICE VISIT (OUTPATIENT)
Facility: MEDICAL CENTER | Age: 65
End: 2025-03-04
Payer: MEDICARE

## 2025-03-04 VITALS
OXYGEN SATURATION: 98 % | HEART RATE: 92 BPM | HEIGHT: 61 IN | RESPIRATION RATE: 16 BRPM | SYSTOLIC BLOOD PRESSURE: 110 MMHG | WEIGHT: 104 LBS | DIASTOLIC BLOOD PRESSURE: 70 MMHG | BODY MASS INDEX: 19.63 KG/M2

## 2025-03-04 VITALS
OXYGEN SATURATION: 98 % | WEIGHT: 104 LBS | RESPIRATION RATE: 16 BRPM | HEIGHT: 61 IN | DIASTOLIC BLOOD PRESSURE: 70 MMHG | SYSTOLIC BLOOD PRESSURE: 110 MMHG | HEART RATE: 92 BPM | BODY MASS INDEX: 19.63 KG/M2

## 2025-03-04 VITALS
OXYGEN SATURATION: 98 % | HEART RATE: 92 BPM | SYSTOLIC BLOOD PRESSURE: 110 MMHG | WEIGHT: 104 LBS | BODY MASS INDEX: 19.63 KG/M2 | HEIGHT: 61 IN | DIASTOLIC BLOOD PRESSURE: 70 MMHG | RESPIRATION RATE: 16 BRPM

## 2025-03-04 VITALS
HEIGHT: 61 IN | DIASTOLIC BLOOD PRESSURE: 70 MMHG | OXYGEN SATURATION: 92 % | HEART RATE: 92 BPM | WEIGHT: 104 LBS | RESPIRATION RATE: 16 BRPM | BODY MASS INDEX: 19.63 KG/M2 | SYSTOLIC BLOOD PRESSURE: 110 MMHG

## 2025-03-04 VITALS
HEIGHT: 61 IN | RESPIRATION RATE: 16 BRPM | HEART RATE: 92 BPM | OXYGEN SATURATION: 98 % | DIASTOLIC BLOOD PRESSURE: 70 MMHG | BODY MASS INDEX: 19.63 KG/M2 | SYSTOLIC BLOOD PRESSURE: 110 MMHG | WEIGHT: 104 LBS

## 2025-03-04 VITALS
SYSTOLIC BLOOD PRESSURE: 110 MMHG | HEART RATE: 92 BPM | HEIGHT: 61 IN | OXYGEN SATURATION: 98 % | RESPIRATION RATE: 16 BRPM | WEIGHT: 104 LBS | BODY MASS INDEX: 19.63 KG/M2 | DIASTOLIC BLOOD PRESSURE: 70 MMHG

## 2025-03-04 VITALS
HEIGHT: 61 IN | SYSTOLIC BLOOD PRESSURE: 110 MMHG | RESPIRATION RATE: 16 BRPM | DIASTOLIC BLOOD PRESSURE: 70 MMHG | OXYGEN SATURATION: 98 % | HEART RATE: 92 BPM | BODY MASS INDEX: 19.63 KG/M2 | WEIGHT: 104 LBS

## 2025-03-04 DIAGNOSIS — N18.6 ESRD (END STAGE RENAL DISEASE) (HCC): ICD-10-CM

## 2025-03-04 DIAGNOSIS — Z83.3 FAMILY HISTORY OF DIABETES MELLITUS: ICD-10-CM

## 2025-03-04 DIAGNOSIS — Z01.818 PRE-TRANSPLANT EVALUATION FOR KIDNEY TRANSPLANT: ICD-10-CM

## 2025-03-04 DIAGNOSIS — B02.21 GENICULATE HERPES ZOSTER: ICD-10-CM

## 2025-03-04 DIAGNOSIS — Z99.2 END STAGE RENAL DISEASE ON DIALYSIS (HCC): ICD-10-CM

## 2025-03-04 DIAGNOSIS — I10 PRIMARY HYPERTENSION: ICD-10-CM

## 2025-03-04 DIAGNOSIS — I31.39 PERICARDIAL EFFUSION: ICD-10-CM

## 2025-03-04 DIAGNOSIS — M34.1 CREST (CALCINOSIS, RAYNAUD'S PHENOMENON, ESOPHAGEAL DYSFUNCTION, SCLERODACTYLY, TELANGIECTASIA) (HCC): ICD-10-CM

## 2025-03-04 DIAGNOSIS — M34.0 SCLERODERMA PROGRESSIVE (HCC): ICD-10-CM

## 2025-03-04 DIAGNOSIS — I30.0 ACUTE IDIOPATHIC PERICARDITIS: ICD-10-CM

## 2025-03-04 DIAGNOSIS — I27.20 PULMONARY HYPERTENSION (HCC): ICD-10-CM

## 2025-03-04 DIAGNOSIS — Z23 NEED FOR VACCINATION: ICD-10-CM

## 2025-03-04 DIAGNOSIS — E04.1 THYROID NODULE: ICD-10-CM

## 2025-03-04 DIAGNOSIS — N18.6 END STAGE RENAL DISEASE ON DIALYSIS (HCC): ICD-10-CM

## 2025-03-04 PROCEDURE — 99205 OFFICE O/P NEW HI 60 MIN: CPT

## 2025-03-04 PROCEDURE — 3078F DIAST BP <80 MM HG: CPT | Performed by: STUDENT IN AN ORGANIZED HEALTH CARE EDUCATION/TRAINING PROGRAM

## 2025-03-04 PROCEDURE — 3074F SYST BP LT 130 MM HG: CPT

## 2025-03-04 PROCEDURE — 3074F SYST BP LT 130 MM HG: CPT | Performed by: STUDENT IN AN ORGANIZED HEALTH CARE EDUCATION/TRAINING PROGRAM

## 2025-03-04 PROCEDURE — 99205 OFFICE O/P NEW HI 60 MIN: CPT | Performed by: STUDENT IN AN ORGANIZED HEALTH CARE EDUCATION/TRAINING PROGRAM

## 2025-03-04 PROCEDURE — 99212 OFFICE O/P EST SF 10 MIN: CPT | Mod: MCR

## 2025-03-04 PROCEDURE — 3078F DIAST BP <80 MM HG: CPT

## 2025-03-04 ASSESSMENT — FIBROSIS 4 INDEX
FIB4 SCORE: 1.290994448735805628

## 2025-03-04 ASSESSMENT — ENCOUNTER SYMPTOMS
CARDIOVASCULAR NEGATIVE: 1
DIARRHEA: 0
NECK PAIN: 0
PALPITATIONS: 0
FEVER: 0
HEMOPTYSIS: 0
PSYCHIATRIC NEGATIVE: 1
DIZZINESS: 0
CHILLS: 0
COUGH: 0
ABDOMINAL PAIN: 1
NAUSEA: 1
VOMITING: 0
DOUBLE VISION: 0
RESPIRATORY NEGATIVE: 1
EYES NEGATIVE: 1
MUSCULOSKELETAL NEGATIVE: 1
NEUROLOGICAL NEGATIVE: 1
CONSTITUTIONAL NEGATIVE: 1
CONSTIPATION: 0
EYE PAIN: 0
BLOOD IN STOOL: 0
HEADACHES: 0
HEARTBURN: 1
MYALGIAS: 0
DEPRESSION: 0

## 2025-03-04 NOTE — PROGRESS NOTES
"Initial Transplant Nutrition Evaluation     Yasemin Villagran ([id number]) is a 64 y.o. female who presents for an initial Kidney transplant nutrition evaluation on 03/04/25. Pt presents with self.  Ipad used ([id number]) Hu.     Nutrition Assessment     Anthropometrics:    Height:   Ht Readings from Last 1 Encounters:   03/04/25 1.549 m (5' 1\")    Weight:   Wt Readings from Last 1 Encounters:   03/04/25 47.2 kg (104 lb)     BMI: Body mass index is 19.65 kg/m². Class: Normal  Suspected Dry Weight: 47.8 kg  Weight Hx:     Wt Readings from Last 10 Encounters:   03/04/25 47.2 kg (104 lb)   03/04/25 47.2 kg (104 lb)   03/04/25 47.2 kg (104 lb)   03/04/25 47.2 kg (104 lb)   03/04/25 47.2 kg (104 lb)   06/25/24 46.2 kg (101 lb 13.6 oz)   06/06/23 54.9 kg (121 lb 0.5 oz)   12/26/22 58.2 kg (128 lb 4.9 oz)   01/15/20 55.2 kg (121 lb 12.2 oz)   08/28/19 57 kg (125 lb 10.6 oz)      Nutrition Focused Physical Exam (NFPE):   Muscle mass: Well Nourished  Subcutaneous fat: Well Nourished  Fluid Accumulation: None; arthiritis causing swelling    Renal Replacement:   Primary Cause of ESRD: Hypertensive Nephrosclerosis   Pt currently on Renal Replacement: HD  Date Started: March 2012  Dialysis Facility: [facility]: [facility]    PMHx:   Patient Active Problem List   Diagnosis    Hypertension    End stage renal disease on dialysis (HCC)    Family history of diabetes mellitus    [place]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)    Chest pain    Pericardial effusion    Pneumonia    Acute idiopathic pericarditis    Hypotension    Hyperkalemia      Labs:   Lab Results   Component Value Date/Time    POTASSIUM 4.3 06/26/2024 03:05 AM    PHOSPHORUS 4.0 08/02/2024 02:58 AM    PHOSPHORUS 5.1 (H) 06/26/2024 03:05 AM    GLUCOSE 82 12/17/2024 09:37 AM    GLUCOSE 93 06/26/2024 03:05 AM    HBA1C 4.7 (L) 01/02/2025 10:26 AM     Lab Results   Component Value Date/Time    SODIUM 135 06/26/2024 03:05 AM    MAGNESIUM 2.4 06/26/2024 03:05 AM    " "CALCIUM 11.0 (H) 06/26/2024 03:05 AM    CHLORIDE 91 (L) 06/26/2024 03:05 AM    CO2 27 06/26/2024 03:05 AM    BUN 28 (H) 06/26/2024 03:05 AM    CREATININE 4.37 (HH) 06/26/2024 03:05 AM    GLOMRATE 8 (L) 04/13/2023 12:53 PM    ALBUMIN 3.5 06/25/2024 02:49 AM     No components found for: \"25-HYDROXY VITAMIN D 25\"    Medications:   Current Outpatient Medications   Medication Sig Refill Last Dispense    acetaminophen (TYLENOL) 325 MG Tab Take 650 mg by mouth every 6 hours as needed for Fever or Mild Pain.  Unknown (patient-reported)    amLODIPine (NORVASC) 10 MG Tab Take 10 mg by mouth every day.  Unknown (patient-reported)    atorvastatin (LIPITOR) 40 MG Tab Take 40 mg by mouth at bedtime.  Unknown (patient-reported)    B Complex-C-Folic Acid (FOLIC ACID 1 MG-VIT B COMPLEX) 1 MG Cap Take 1 Capsule by mouth every day.  Unknown (patient-reported)    cloNIDine (CATAPRES) 0.1 MG Tab Take 0.1 mg by mouth every day.  Unknown (patient-reported)    cyanocobalamin (VITAMIN B-12) 100 MCG Tab Take 100 mcg by mouth every day.  Unknown (patient-reported)    losartan (COZAAR) 25 MG Tab Take 25 mg by mouth every day.  Unknown (patient-reported)    MELATONIN PO Take 1 Tablet by mouth at bedtime as needed.  Unknown (patient-reported)    omeprazole (PRILOSEC) 40 MG delayed-release capsule Take 40 mg by mouth every day.  Unknown (patient-reported)    polyethylene glycol/lytes (MIRALAX) Pack Take 17 g by mouth 1 time a day as needed.  Unknown (patient-reported)    sevelamer (RENAGEL) 800 MG Tab Take 800 mg by mouth 3 times a day with meals. Taking 2 tablets with each meal and 1 with each snack    Indications: takes 3 tabs 3 times a day  Unknown (patient-reported)      Diabetes Management:   No hx of DM    Diet & Social History     Nutrition Notes:   Diet/Restrictions: Kidney diet   Appetite: Good  Meals: x2 meals w/ frequent snacking   B: Coffee, oatmeal, fruit, eggs  D: Meat, soup, veggies  S: yogurt, almonds, fruit  F: Water, tea, juice "   Supplements: Protein bars , ensure daily  Dinning Out Frequency: every 15 days, likes olive garden, chinese food, in-n-out, pizza   ETOH: No  GI: nausea, vomiting, constipation; states MD aware, on miralax, random occurences    Lifestyle:   Energy Level: Fine  Physical activity: Walking and dancing    Activity restrictions: None   FRIED Frailty: Assessment not needed; Pt not frail  Does patient smoke? non-smoker    Summary     Handouts Provided/Education Completed:   Kidney Transplant Nutrition Education    Yasemin Villagran is a Good candidate for kidney transplantation from a nutrition perspective.   BMI: Body mass index is 19.65 kg/m².  A1c: 4.7%  Malnutrition Status: Well nourished  FRIED Frailty: Assessment not indicated; Not frail

## 2025-03-04 NOTE — PROGRESS NOTES
Transplant evaluation note - Kidney transplant    3/4/2025    Referring Provider: Beronica Dietrich M.D.    Primary Care Provider: Pankaj Solorzano M.D.  Primary nephrologist  Beronica Dietrich M.D.    Reason for Consultation: Evaluation for kidney transplant recipient candidacy       3/4/2025     7:30 AM    SERVICES   Is patient using  services for this encounter? Yes   Language Interpreted Thai    Mode iPad   Content of Interpretation (select all) History/Visit information;Patient Education;Consent;Orders;Discharge Instructions (AVS);Medications;Other      Name : Anne Durbin   ID : [id number]                                            History of Present Illness:  Yasemin Villagran is a 64 y.o. female who presents today for kidney transplant evaluation. . Yasemin Villagran has scleroderma (? CREST syndrome), systemic hypertension, hyperlipidemia, ESRD on HD, suspected pulmonary hypertension.     I personally saw and examined this patient. My history and physical is based on my own examination and interaction with the patient and those present, on available medical records, as well as on the reports and observations of other members of the team.      Cardiovascular    - functional capacity good  - CAD : She had LHC done 6/20/2024 with no obstructive CAD  - PVD  no known  - Cardiac arrhythmia denies  - Valvular heart disease no known    Respiratory    - smoking/vaping denies  - asthma/COPD denies    Autoimmune disease  -  + YASEMIN 1:1280 centromere pattern with anti centromere > 1:8 with calcinosis, sclerodactyly with inflammatory arthritis, telangiectasia, GERD  - seen by rheumatologist Dr. Fiona Koch at [facility]     Renal disease    - nephrolithiasis/stone denies  - on renal replacement therapy/dialysis  Yes:[facility] MWF since 2012   - Dialysis access : LUE AVF   - exhausted access for dialysis No   - prior kidney transplant  No         GI/Hepatology  - gastroparesis  Yes  - chronic diarrhea  No   - significant liver disease/cirrhosis No   - small bowel bacterial overgrowth : abdominal distention with bloating and flatulence Rx with rifaximin in 2024     Hematologic/oncologic    - Prothrombotic No   - Bleeding No   - prior blood transfusion NO  - cancer  No   - any hx of skin cancer including melanoma : No    Endocrine/Diabetes   - not diabetic  - hx of thyroid nodule.  She had a neck US in 2023 which showed 9mm inferior pole thyroid nodule , other subcentimeter category 4 nodules and hypoechoic mass suspected for small LN.   She already had whole PET/CT scan done 2024 negative for adenopathy      Infectious    - recurrent UTI  No   - past serious infection required hospitalization no    Neurological    - hx of cerebrovascular disease NO  intact    Mental health/psychiatric    - Past substance use  No  - active substance use No  - marijuana smoking/smoking/vaping No  - depression/bipolar No      Fertility/OBGYN  - [id number]  miscarriage at 5 months GA   - menopause 12 yrs      Age Sex appropriate cancer screening  - Colorectal cancer screening CRCscreen: Colonoscopy  - Mammogram &  P[facility] smear [facility]        Does patient have potential living donor for kidney transplant ?     No             Review of Systems   Constitutional:  Negative for chills and fever.   HENT:  Negative for hearing loss and tinnitus.    Eyes:  Negative for double vision and pain.   Respiratory:  Negative for cough and hemoptysis.    Cardiovascular:  Negative for chest pain and palpitations.   Genitourinary:  Negative for dysuria and urgency.   Musculoskeletal:  Negative for myalgias and neck pain.   Skin:  Negative for itching and rash.   Neurological:  Negative for dizziness and headaches.   Psychiatric/Behavioral:  Negative for depression and suicidal ideas.        Past Medical History:  Past Medical History:   Diagnosis Date     Anemia 12/08/2022    history of    Anesthesia 12/08/2022    PONV, pt with history of motion sickness    Breath shortness 12/08/2022    with exertion since 2021    Dental disorder 12/08/2022    full plate upper and partial lower    Dialysis patient (HCC) 12/08/2022 monday, Wednesday, Friday    Disorder of thyroid 12/08/2022    following up with MD, not medicate    Essential hypertension 12/08/2022    medicated    Heart burn 12/08/2022    medicated    High cholesterol 12/08/2022    medicated    Indigestion 12/08/2022    medicated    Kidney disease 12/08/2022    end stage    Pain 12/08/2022    left shoulder and neck with tingling and    Pneumonia 12/08/2022    history of    PONV (postoperative nausea and vomiting) 12/08/2022    history of motion sickness    [place]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) 10/03/2017    Snoring 12/08/2022    Urinary tract infection, site not specified        Past Surgical History:  Past Surgical History:   Procedure Laterality Date    AV FISTULA CREATION Left 12/26/2022    Procedure: LEFT BRACHIOCEPHALIC ARTERIOVENOUS FISTULA CREATION.;  Surgeon: Mauro Saavedra M.D.;  Location: SURGERY [facility];  Service: General    COLONOSCOPY - ENDO  12/20/2016    Procedure: COLONOSCOPY - ENDO;  Surgeon: Fredy Gustafson M.D.;  Location: SURGERY [facility];  Service:     AV FISTULA THROMBOLYSIS         Current Outpatient Medications   Medication Sig Dispense Refill    amLODIPine (NORVASC) 10 MG Tab Take 10 mg by mouth every day.      B Complex-C-Folic Acid (FOLIC ACID 1 MG-VIT B COMPLEX) 1 MG Cap Take 1 Capsule by mouth every day.      cloNIDine (CATAPRES) 0.1 MG Tab Take 0.1 mg by mouth every day.      losartan (COZAAR) 25 MG Tab Take 25 mg by mouth every day.      predniSONE (DELTASONE) 5 MG Tab  (Patient not taking: Reported on 3/4/2025)      riFAXIMin (XIFAXAN) 550 MG Tab tablet  (Patient not taking: Reported on 3/4/2025)      aspirin 81 MG EC tablet Take 81 mg by mouth every day.  (Patient not taking: Reported on 3/4/2025)      atorvastatin (LIPITOR) 40 MG Tab Take 40 mg by mouth at bedtime.      omeprazole (PRILOSEC) 40 MG delayed-release capsule Take 40 mg by mouth every day.      polyethylene glycol/lytes (MIRALAX) Pack Take 17 g by mouth 1 time a day as needed.      cyanocobalamin (VITAMIN B-12) 100 MCG Tab Take 100 mcg by mouth every day.      MELATONIN PO Take 1 Tablet by mouth at bedtime as needed.      acetaminophen (TYLENOL) 325 MG Tab Take 650 mg by mouth every 6 hours as needed for Fever or Mild Pain.      sevelamer (RENAGEL) 800 MG Tab Take 800 mg by mouth 3 times a day with meals. Indications: takes 3 tabs 3 times a day       No current facility-administered medications for this visit.       Allergies: No Known Allergies    Social History:   Social History     Socioeconomic History    Marital status: Single     Spouse name: Not on file    Number of children: 1    Years of education: 0    Highest education level: Not on file   Occupational History    Not on file   Tobacco Use    Smoking status: Never    Smokeless tobacco: Never   Vaping Use    Vaping status: Never Used   Substance and Sexual Activity    Alcohol use: No     Alcohol/week: 0.0 oz    Drug use: No    Sexual activity: Not Currently   Other Topics Concern     Service No    Blood Transfusions Yes     Comment: 4yrs ago when they started doing dialysis    Caffeine Concern No    Occupational Exposure No    Hobby Hazards No    Sleep Concern No    Stress Concern No    Weight Concern No    Special Diet Yes     Comment: no salts or processed food     Back Care Not Asked    Exercise No    Bike Helmet Not Asked    Seat Belt Yes    Self-Exams No   Social History Narrative    Not on file     Social Drivers of Health     Financial Resource Strain: Not on File (4/29/2024)    [facility] from [facility]    Financial Resource Strain     Financial Resource Strain: 0   Food Insecurity: Not on File (9/26/2024)    Received from [facility]  "   Food Insecurity     Food: 0   Transportation Needs: Low Risk  (8/2/2024)    [facility]     In the last month, have you not seen a doctor because you didn't have a way to get to the clinic or hospital?: No   Physical Activity: Not on File (4/29/2024)    Received from [facility]    Physical Activity     Physical Activity: 0   Stress: Not on File (4/29/2024)    Received from [facility]    Stress     Stress: 0   Social Connections: Not on File (9/20/2024)    Received from [facility]    Social Connections     Connectedness: 0   Intimate Partner Violence: Not At Risk (6/24/2024)    Humiliation, Afraid, Rape, and Kick questionnaire     Fear of Current or Ex-Partner: No     Emotionally Abused: No     Physically Abused: No     Sexually Abused: No   Housing Stability: Low Risk  (8/2/2024)    [facility] Housing Needs     In the last month, was there a time when you were not able to pay your mortgage or rent?: No     In the last month, have you slept outside, in a shelter, in a car, or any place not meant for sleeping?: Not on file       Family History:  Family History   Problem Relation Age of Onset    Diabetes Father     Lung Disease Sister     No Known Problems Brother        Physical Examination:  /70 (BP Location: Right arm, Patient Position: Sitting, BP Cuff Size: Adult)   Pulse 92   Resp 16   Ht 1.549 m (5' 1\")   Wt 47.2 kg (104 lb)   SpO2 98%   Body mass index is 19.65 kg/m².  Physical Exam  Vitals reviewed.   Constitutional:       Appearance: Normal appearance.   HENT:      Head: Normocephalic and atraumatic.      Mouth/Throat:      Mouth: Mucous membranes are moist.      Pharynx: Oropharynx is clear.   Eyes:      Conjunctiva/sclera: Conjunctivae normal.      Pupils: Pupils are equal, round, and reactive to light.   Cardiovascular:      Rate and Rhythm: " Normal rate and regular rhythm.      Heart sounds:      No friction rub. No gallop.      Comments: Suspected venous hum at both upper sternal borders  LUE AVF (currently used)  Right arm AVF  Femoral pulses 2+ both sides  Pulmonary:      Effort: No respiratory distress.      Breath sounds: Normal breath sounds. No wheezing or rales.   Abdominal:      General: There is no distension.      Palpations: Abdomen is soft.      Tenderness: There is no abdominal tenderness. There is no guarding.   Musculoskeletal:         General: No swelling or tenderness.   Skin:     Coloration: Skin is not jaundiced or pale.      Comments: Sclerodactyly with thickened skin   Neurological:      General: No focal deficit present.      Mental Status: She is alert and oriented to person, place, and time.   Psychiatric:         Mood and Affect: Mood normal.         Thought Content: Thought content normal.         Labs:    CMP:   Lab Results   Component Value Date/Time    SODIUM 135 06/26/2024 03:05 AM    POTASSIUM 4.3 06/26/2024 03:05 AM    CHLORIDE 91 (L) 06/26/2024 03:05 AM    CO2 27 06/26/2024 03:05 AM    ANION 17.0 (H) 06/26/2024 03:05 AM    GLUCOSE 82 12/17/2024 09:37 AM    GLUCOSE 93 06/26/2024 03:05 AM    BUN 28 (H) 06/26/2024 03:05 AM    CREATININE 4.37 (HH) 06/26/2024 03:05 AM    CALCIUM 11.0 (H) 06/26/2024 03:05 AM    ASTSGOT 15 06/25/2024 02:49 AM    ALTSGPT 15 06/25/2024 02:49 AM    TBILIRUBIN 0.4 06/25/2024 02:49 AM    ALBUMIN 3.5 06/25/2024 02:49 AM    TOTPROTEIN 6.8 06/25/2024 02:49 AM    GLOBULIN 3.3 06/25/2024 02:49 AM    AGRATIO 1.1 06/25/2024 02:49 AM       PT/INR:   Lab Results   Component Value Date/Time    PROTHROMBTM 10.8 09/26/2017 10:40 PM    INR 1.03 09/26/2017 10:40 PM       CBC:   Lab Results   Component Value Date/Time    WBC 5.6 01/02/2025 10:26 AM    WBC 12.5 (H) 06/26/2024 03:05 AM    RBC 2.88 (L) 01/22/2025 06:56 PM    RBC 3.57 (L) 06/26/2024 03:05 AM    HEMOGLOBIN 10 (L) 01/22/2025 06:56 PM    HEMOGLOBIN 11.7  (L) 2024 03:05 AM    .3 (H) 2025 06:56 PM    .0 (H) 2024 03:05 AM    MCH 34.7 (H) 2025 06:56 PM    MCH 32.8 2024 03:05 AM    MCHC 33.9 2025 06:56 PM    MCHC 31.2 (L) 2024 03:05 AM    RDW 15.3 (H) 2025 06:56 PM    RDW 60.5 (H) 2024 03:05 AM    MPV 8.1 2025 06:56 PM    MPV 9.6 2024 03:05 AM       Hgb A1C:   Lab Results   Component Value Date/Time    HBA1C 4.7 (L) 2025 1026       Lipids:   Lab Results   Component Value Date/Time    CHOLSTRLTOT 159 2019 08:56 AM    TRIGLYCERIDE 104 2019 08:56 AM    HDL 44 2019 08:56 AM    LDL 98 2019 08:56 AM         Assessment:     Yasemin Villagran is a 64 y.o. female who  presents today for kidney transplant evaluation.  Patient with presumed hypertensive nephrosclerosis, ESRD on HD, CREST syndrome ,gastroparesis, hx of thyroid nodules.   Patient is a high risk candidate for kidney transplant from medical standpoint. Recommend US neck for hx of thyroid nodule and possible referral to endocrinologist. Recommend to follow through with GI for her hx of H pylori infection and recurrent GI bleeding. Patient will be presented to our multidisciplinary kidney transplant selection committee for final approval.      Risk Factors:include:  1. long term dialysis    2.      PAD with severe atherosclerosis  3.      Hx of recurrent Gi bleeding          We have discussed with the patient at length the risks and benefits of transplantation. Patient is aware that transplantation is a process that requires a lifelong commitment and that it is a personal choice to proceed with it rather than to remain with alternative treatments such as dialysis. Patient understands that the two major benefits of transplantation include prolonged survival and improved quality of life. I have mentioned to the patient that organs from live donors in general do better than those of  donors. We will  attempt to place the kidney on the right side but if that is not possible we may need to go on the contralateral side.     Patient is aware that immunosuppression medications need to be taken in order to preserve the kidney for as long as the organ is functioning. Patient is aware that immunosuppression is required in addition to baseline medications and in addition to other medications such as antimicrobials. The immunosuppression levels will probably be decreased with time. Patient is aware that immunosuppression levels are usually at their highest during the first 6 months, which is coincident with the period of the greatest incidence of rejection and complications. Patient is aware as well that immunosuppression side effects include but are not limited to the onset of worsening of diabetes, allograft damage, increased incidence of all types of infections, increased incidence of all types of malignancies but most prominently of the skin and hematological system, neurological illness and other pathologies. I have instructed the patient to stay away from children or adults after they receive live/attenuated pathogen vaccinations and to check with us prior to receiving any type of vaccination.     Patient is aware that the benefits of transplantation may be diminished or totally canceled by potential complications associated with the surgery itself and/or with the transplant process. Patient is aware that the treatment of complications may require, among other interventions, the need for reoperations, the need for placement of internal and/or external catheters and/or drains, the need for radiological interventional procedures, the need for transfusion of blood products, or observation. Patient is aware that such potential complications include but are not limited to:  Bleeding  Infections with bacteria, viruses, fungi, and parasites  Leaks of urine  Ureteral and vascular strictures  Thrombosis of the organ  associated with twisting of the blood vessels, rejection, occlusion from events such as intimal flaps and unintentional mispositioning of sutures during the surgery, low blood pressure, or other events, both known and/or unknown  Cardiac events  Injury to blood vessels associated with the transplant surgery that may lead to attempts to revascularize an extremity  Injury to nerves, either temporary or long-lasting  Fluid collections requiring external and/or internal drainage  Delayed function of the kidney, requiring dialysis for a period ranging from days to months, or in some cases nonfunction of the kidney    Patient may agree or refuse to receive transfusion of recommended blood products if necessary. Patient is aware that such transfusion of blood products, as well as the transplant organ itself, may be associated with transmission of infections (such as hepatitis B, hepatitis C, HIF, COVID, or West Nile virus), malignancies, and/or other pathologies.    Patient is aware that the incidence of malignancies is nonfunctioning kidneys is higher than in those with normal function. Patient is also aware that immunosuppression will make this incidence even higher. Patient is encouraged to have screening imaging exams on a yearly or biyearly basis in order to detect such tumors at an early stage.     Patient is aware that it is possible to have children after transplantation (for those in childbearing age). We have instructed the patient to wait until there is stable kidney function and to notify us in advance so we can discontinue teratogenic medications, including but not limited to mycophenolic compounds.     Patient is aware that the average survival of transplanted kidneys is approximately 13 years and that a second kidney transplant may be required, especially in younger patients.     I have answered all of the patient's questions, as well as all questions of those present with the patient. Patient understands we  can offer no guarantees and agrees to proceed. Once all of this is completed, the patient should be presented to the Multidisciplinary Transplant Selection Committee for a decision on whether to proceed with listing and transplantation.       Mitch Hunter MD  Transplant nephrologist & Medical Director  [facility] [place]    Email address : [email or web address]  Tel : [phone]  FAX : [phone]  Mobile : [phone]         I spent between 60 and 74 minutes of care time with this patient, including direct face-to-face contact with this patient, history taking, examination, researching the pertinent past medical record, counseling regarding the risks and benefits of kidney transplantation and other options for treatment modalities in end stage kidney disease, coordinate further care with primary care providers and/or referring providers.
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
## 2025-03-04 NOTE — PROGRESS NOTES
Recipient Psychosocial Assessment    Patient: Yasemin Villagran  : 1960   Organ: Kidney  Identifying information Yasemin is a 63 yo female who presents today for a kidney transplant evaluation. She works full time, but is on FMLA since . She lives by herself and is able to drive. She has been on dialysis for 12 years, currently at [facility]. She reports strong compliance with dialysis and her medication. Her goals post transplant is to travel.     Patient is a suitable candidate from a psychosocial perspective for kidney transplant. She is low risk for psychosocial concerns after transplant. Pending approval of caregiver plan.     Housing/ Living arrangements1 [facility]  Social support/Family history Patient's friend Richelle Whitten is her primary support, she also has a son  Highest level of education: 12th grade    Financial resources/Employment history:  Patient Patient has [employer]   Working: Yes   If yes, place of employment: [employer], currently on FMLA since   Partner: none   Other: none     Insurance/Medication coverage:   [facility]ERS  Secondary [facility]     language: Faroese [3]    required Yes [1]     Substance use: No   Treatment or rehabilitation for substance use No    Legal difficulties: None   Adherence: Yasemin reports strong adherence to dialysis and medications   History of illness and understanding of transplant: Yasemin has been on dialysis for 12 years, she understands her diagnosis and the management requires of ESRD  Mental status/Psychiatric history Patient denies any hx of mental illness  History of mental health symptoms: none   Coping style, skills, and adaptation to   illness: patient reports she marleen with stress by dancing   Other current stressors: Some stress surrounding listing at [facility] and transportation plan. Yasemin expressed that she would prefer to receive a transplant here.      Post transplant care  arrangements:   Transportation to/from [facility]: Pt's friend Richelle to provide transportation  Caregiver information:Richelle Whitten [phone]    Assessment:  No known psychosocial obstacles to transplant. Interviewed as part of the transplant evaluation process.    Plan:   to provide supportive services as needed. No f/u indicated at this time.    : NI Mixon

## 2025-03-04 NOTE — PROGRESS NOTES
"Yasemin Villagran has completed the \"Introduction to Kidney Transplant\" education presentation.    The presentation, led by the Transplant Coordinator, provided an overview of the kidney transplant process, including:    Evaluation steps  Consent requirements  Key educational components:  The transplant evaluation timeline  Criteria for listing and maintaining eligibility  Risks and benefits of transplant surgery  Post-transplant care, including immunosuppressive medications and follow-up requirements    Manuel Blankenship R.N.  03/04/25  Transplant Coordinator  [facility]  "

## 2025-03-04 NOTE — PROGRESS NOTES
"Kidney Transplant RN Evaluation    Date of Evaluation: 2025     Patient Name: Yasemin Villagran  MRN: [id number]  : 1960  Age: 64 y.o.    Referring Nephrologist: Dr. Beronica Dietrich  Dialysis Facility: [facility]   Dialysis Modality: ICHD, MWF 1st Shift   Primary Cause of ESRD (if applicable): I12.0: Hypertensive Chronic Kidney Disease with ESRD  Chronic Dialysis Start Date: 3/9/2012    The patient completed the “Introduction to Kidney Transplant” education session and has agreed to proceed with evaluation.    Vitals     Ht Readings from Last 1 Encounters:   25 1.549 m (5' 1\")      Wt Readings from Last 1 Encounters:   25 47.2 kg (104 lb)      Body mass index is 19.65 kg/m².     BP Readings from Last 1 Encounters:   25 110/70      Pulse Readings from Last 1 Encounters:   25 92     Clinical Information     No Known Allergies  Current Outpatient Medications:     amLODIPine, 10 mg, Oral, DAILY    folic acid 1 mg-vit B complex, 1 Capsule, Oral, DAILY    cloNIDine, 0.1 mg, Oral, DAILY    losartan, 25 mg, Oral, DAILY    predniSONE,  (Patient not taking: Reported on 3/4/2025)    riFAXIMin,  (Patient not taking: Reported on 3/4/2025)    aspirin, 81 mg, Oral, DAILY (Patient not taking: Reported on 3/4/2025)    atorvastatin, 40 mg, Oral, QHS    omeprazole, 40 mg, Oral, DAILY    polyethylene glycol/lytes, 17 g, Oral, QDAY PRN    cyanocobalamin, 100 mcg, Oral, DAILY    MELATONIN PO, 1 Tablet, Oral, QHS PRN    acetaminophen, 650 mg, Oral, Q6HRS PRN    sevelamer, 800 mg, Oral, TID WITH MEALS     Social History     Tobacco Use    Smoking status: Never    Smokeless tobacco: Never   Vaping Use    Vaping status: Never Used   Substance Use Topics    Alcohol use: No     Alcohol/week: 0.0 oz    Drug use: No   If a history of smoking is present, document pack-year history (# packs/day × years smoked): 0    Family History   Problem Relation Age of Onset    Diabetes Father     Lung Disease " Sister     No Known Problems Brother        Past Surgical History:   Procedure Laterality Date    AV FISTULA CREATION Left 12/26/2022    Procedure: LEFT BRACHIOCEPHALIC ARTERIOVENOUS FISTULA CREATION.;  Surgeon: Mauro Saavedra M.D.;  Location: SURGERY 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    Chest pain 06/24/2024    Pericardial effusion 06/24/2024    Pneumonia 06/24/2024    Acute idiopathic pericarditis 06/25/2024    Hypotension 06/25/2024    Hyperkalemia 06/25/2024     Resolved Ambulatory Problems     Diagnosis Date Noted    Essential hypertension 05/09/2012     Past Medical History:   Diagnosis Date    Anemia 12/08/2022    Anesthesia 12/08/2022    Breath shortness 12/08/2022    Dental disorder 12/08/2022    Dialysis patient (HCC) 12/08/2022    Disorder of thyroid 12/08/2022    Heart burn 12/08/2022    High cholesterol 12/08/2022    Indigestion 12/08/2022    Kidney disease 12/08/2022    Pain 12/08/2022    PONV (postoperative nausea and vomiting) 12/08/2022    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    Snoring 12/08/2022    Urinary tract infection, site not specified      Relevant Medical History     Cardiac & Vascular History:  History of Heart Disease: HTN, pericardial effusion, pericarditis, HLD  History of Stroke/TIA: NO  Peripheral Vascular Disease (Symptomatic): NO    Bleeding & Clotting History:  Hx of Bleeding/Clotting Disorder: Previous GI Bleed  Currently on Anticoagulation Therapy: NO, ASA 81 mg discontinued     Diabetes & Metabolic History:  Diabetes Status: Non-diabetic    Age at Diabetes Onset: N/A  If Type 1, date started on Insulin Therapy: N/A    Neurological & Cognitive:  Cognitive Status Concerns: NO    Dialysis Access History:  Current Dialysis Access: LUE AVF   Exhausted Vascular Access: NO, previous RUE Access-no longer functional   Exhausted Peritoneal Dialysis Access: NO    Infections & Malignancies:  Recent Infections: NO  History of Malignancy: NO    Urine Output: NO    Sensitizing Events     History of Blood Transfusions: YES  Prior Transplants: NO  Pregnancy History (if applicable): 2 Pregnancies, 1 Child   Previous Pancreas Islet Infusion: NO    Transplant Candidate Registration (TCR) Source Data for TIEDI     Recipient Center: [facility]  Organ: Kidney     Candidate Information confirmed with Patient:  Name: Yasemin Stanley  Sex: Female   : 1960  State of Permanent Residence: [place] ZIP Code: [id number]   Race: White [7]   Ethnicity:  Origin (Polish,Argentine,[place] Islander,[place], etc) [2]   Highest Education Level: Graduate School   Functional Status: 90, Able to carry on normal activity; minor signs or symptoms of disease (ECOG equivalent 0)  Working for Income: YES    Nursing Transplant Education     Barriers to Learning: None     Topics Discussed    Evaluation Process & Considerations:  Overview of evaluation requirements, risks, benefits, and alternative treatments.  Explanation of inclusion & exclusion criteria; written copy provided.  Discussion of factors that may affect eligibility.  Transplant Process & Team Roles:  Explanation of team members’ roles before and after transplant.  Expectations & commitments for patient and family.  Waitlist & UNOS Policy:  Explanation of UNOS Multiple Listings policy, active/inactive status, and transfer options.  Patient may transfer care without losing accumulated wait time.  Donor Options & Risk Considerations:     Overview of living &  donor options.  Explanation of PHS increased risk donor  considerations & consent requirements.  Post-Transplant Care & Follow-up:  Pre- and post-transplant testing requirements, including mandatory HIV testing.  Discussion of clinic visits, medication adherence, lab monitoring, and lifestyle changes.  [facility] [place] Considerations:  Patient informed that [facility] is a newly established transplant center and not yet Medicare-certified.  SRTR outcome data is unavailable due to the center’s new status.  Patient Rights & Decision-Making:  Patient understands they may discontinue evaluation or refuse transplant at any time.  Provided transplant team contact information for questions.    Nursing Plan & Next Steps     Consent for Kidney Transplant Evaluation has been signed.  Patient reviewed and acknowledged  donor acceptance criteria.  Case will be presented to the Transplant Selection Committee once all required diagnostic testing is completed.    Required Testing for Initial Evaluation    Imaging Studies:   CT Abdomen/Pelvis-Records Needed from [facility]   US-Thyroid    Cancer Screening (To be arranged by PCP):   Colonoscopy-Records Needed from [facility]     Consults   GI Clearance-Appt Scheduled 2025  Pulmonary Clearance     Next Steps    All outside records should be faxed to [facility] at [phone].  The required testing for transplant evaluation has been reviewed with the patient.   Patient understands additional tests may be required based on results.      Manuel Blankenship R.N.  Transplant Coordinator  [facility]

## 2025-03-05 ENCOUNTER — HOSPITAL ENCOUNTER (OUTPATIENT)
Dept: RADIOLOGY | Facility: MEDICAL CENTER | Age: 65
End: 2025-03-05
Payer: COMMERCIAL

## 2025-03-05 NOTE — PATIENT INSTRUCTIONS
Raymond Villagran,     ¡Fue fantástico reunirnos con usted hoy para la evaluación de [place] trasplante!    Girish comentamos, se recomiendan las siguientes vacunas antes del trasplante:    RSV vacuna (x 1 dosis)  Shingrix vacuna (Dosis 2 de 2)    No dude en comunicarse si tiene alguna pregunta o problema.    Vikki,   Corin Garcia, PharmSIMONE  3/4/2025    ---------------------------------------------------------------    Rafael Villagran,     It was great to meet with you for your transplant evaluation today!    As we discussed, the following immunizations are recommended prior to transplant:     RSV vaccine (x 1 dose)  Shingrix for shingles (Dose 2 of 2)    Please reach out with any questions or issues.     Thank you,   Corin Garcia, PharmD  3/4/2025

## 2025-03-12 ENCOUNTER — TELEPHONE (OUTPATIENT)
Facility: MEDICAL CENTER | Age: 65
End: 2025-03-12
Payer: COMMERCIAL

## 2025-03-12 NOTE — TELEPHONE ENCOUNTER
Kidney Transplant RN Coordinator Brief Note     Called patient, Yasemin Villagran today to follow-up on a voicemail patient left yesterday informing the Transplant Team that she has received her 2nd shingles vaccine as well as her 4th Covid vaccination. No answer, left voicemail to return call.       Manuel Blankenship R.N.  Transplant Coordinator  [facility] [place]

## 2025-05-09 DIAGNOSIS — Z01.818 PRE-TRANSPLANT EVALUATION FOR KIDNEY TRANSPLANT: ICD-10-CM

## 2025-05-16 ENCOUNTER — TELEPHONE (OUTPATIENT)
Facility: MEDICAL CENTER | Age: 65
End: 2025-05-16
Payer: COMMERCIAL

## 2025-05-16 NOTE — TELEPHONE ENCOUNTER
Outgoing call to patient to discuss outstanding imaging and appointments for her pre-kidney transplant evaluation. Called x 2, unable to leave voicemail.    Language Access Line -Olga (ID [id number])    Yumiko Jones R.N.   Transplant Coordinator  [facility] [place]

## 2025-06-05 ENCOUNTER — HOSPITAL ENCOUNTER (OUTPATIENT)
Dept: LAB | Facility: MEDICAL CENTER | Age: 65
End: 2025-06-05
Attending: STUDENT IN AN ORGANIZED HEALTH CARE EDUCATION/TRAINING PROGRAM
Payer: MEDICARE

## 2025-06-05 DIAGNOSIS — E04.1 THYROID NODULE: ICD-10-CM

## 2025-06-05 DIAGNOSIS — Z01.818 PRE-TRANSPLANT EVALUATION FOR KIDNEY TRANSPLANT: ICD-10-CM

## 2025-06-05 DIAGNOSIS — M34.1 CREST (CALCINOSIS, RAYNAUD'S PHENOMENON, ESOPHAGEAL DYSFUNCTION, SCLERODACTYLY, TELANGIECTASIA) (HCC): ICD-10-CM

## 2025-06-05 DIAGNOSIS — N18.6 ESRD (END STAGE RENAL DISEASE) (HCC): ICD-10-CM

## 2025-06-05 LAB
A1 AG RBC QL: NORMAL
A1 AG RBC QL: NORMAL
ABO + RH BLD: NORMAL
ABO + RH BLD: NORMAL

## 2025-06-05 PROCEDURE — 80061 LIPID PANEL: CPT

## 2025-06-05 PROCEDURE — 86735 MUMPS ANTIBODY: CPT

## 2025-06-05 PROCEDURE — 84443 ASSAY THYROID STIM HORMONE: CPT

## 2025-06-05 PROCEDURE — 86665 EPSTEIN-BARR CAPSID VCA: CPT

## 2025-06-05 PROCEDURE — 86641 CRYPTOCOCCUS ANTIBODY: CPT

## 2025-06-05 PROCEDURE — 85610 PROTHROMBIN TIME: CPT

## 2025-06-05 PROCEDURE — 86480 TB TEST CELL IMMUN MEASURE: CPT

## 2025-06-05 PROCEDURE — 87536 HIV-1 QUANT&REVRSE TRNSCRPJ: CPT | Mod: 59

## 2025-06-05 PROCEDURE — 86901 BLOOD TYPING SEROLOGIC RH(D): CPT | Mod: 91

## 2025-06-05 PROCEDURE — 86696 HERPES SIMPLEX TYPE 2 TEST: CPT

## 2025-06-05 PROCEDURE — 86777 TOXOPLASMA ANTIBODY: CPT

## 2025-06-05 PROCEDURE — 80307 DRUG TEST PRSMV CHEM ANLYZR: CPT

## 2025-06-05 PROCEDURE — 87522 HEPATITIS C REVRS TRNSCRPJ: CPT

## 2025-06-05 PROCEDURE — 86780 TREPONEMA PALLIDUM: CPT

## 2025-06-05 PROCEDURE — 85730 THROMBOPLASTIN TIME PARTIAL: CPT

## 2025-06-05 PROCEDURE — 86682 HELMINTH ANTIBODY: CPT

## 2025-06-05 PROCEDURE — 86644 CMV ANTIBODY: CPT

## 2025-06-05 PROCEDURE — 86900 BLOOD TYPING SEROLOGIC ABO: CPT

## 2025-06-05 PROCEDURE — 86787 VARICELLA-ZOSTER ANTIBODY: CPT

## 2025-06-05 PROCEDURE — 86905 BLOOD TYPING RBC ANTIGENS: CPT | Mod: 91

## 2025-06-05 PROCEDURE — G0475 HIV COMBINATION ASSAY: HCPCS

## 2025-06-05 PROCEDURE — 86635 COCCIDIOIDES ANTIBODY: CPT

## 2025-06-05 PROCEDURE — 86694 HERPES SIMPLEX NES ANTBDY: CPT

## 2025-06-05 PROCEDURE — 86765 RUBEOLA ANTIBODY: CPT

## 2025-06-05 PROCEDURE — 87517 HEPATITIS B DNA QUANT: CPT

## 2025-06-05 PROCEDURE — 36415 COLL VENOUS BLD VENIPUNCTURE: CPT

## 2025-06-05 PROCEDURE — 86762 RUBELLA ANTIBODY: CPT

## 2025-06-05 PROCEDURE — 86695 HERPES SIMPLEX TYPE 1 TEST: CPT

## 2025-06-06 LAB
APTT PPP: 33.5 SEC (ref 24.7–36)
CHOLEST SERPL-MCNC: 160 MG/DL (ref 100–199)
GAMMA INTERFERON BACKGROUND BLD IA-ACNC: 0.03 IU/ML
HDLC SERPL-MCNC: 64 MG/DL
HIV 1+2 AB+HIV1 P24 AG SERPL QL IA: NORMAL
INR PPP: 0.98 (ref 0.87–1.13)
LDLC SERPL CALC-MCNC: 73 MG/DL
M TB IFN-G BLD-IMP: NEGATIVE
M TB IFN-G CD4+ BCKGRND COR BLD-ACNC: -0.01 IU/ML
MITOGEN IGNF BCKGRD COR BLD-ACNC: 6.75 IU/ML
PROTHROMBIN TIME: 13 SEC (ref 12–14.6)
QFT TB2 - NIL TBQ2: 0 IU/ML
T PALLIDUM AB SER QL IA: NORMAL
TRIGL SERPL-MCNC: 116 MG/DL (ref 0–149)
TSH SERPL DL<=0.005 MIU/L-ACNC: 2.63 UIU/ML (ref 0.38–5.33)

## 2025-06-07 LAB
AMPHETAMINES SERPL QL SCN: NEGATIVE NG/ML
ANNOTATION COMMENT IMP: NORMAL
BARBITURATES SERPL QL SCN: NEGATIVE NG/ML
BENZODIAZ SERPL QL SCN: NEGATIVE NG/ML
BUPRENORPHINE SERPL-MCNC: NEGATIVE NG/ML
CANNABINOIDS SERPL QL SCN: NEGATIVE NG/ML
COCAINE SERPL QL SCN: NEGATIVE NG/ML
HCV RNA SERPL NAA+PROBE-ACNC: NOT DETECTED IU/ML
HCV RNA SERPL NAA+PROBE-LOG IU: NOT DETECTED LOG IU/ML
HCV RNA SERPL QL NAA+PROBE: NOT DETECTED
METHADONE SERPL QL SCN: NEGATIVE NG/ML
METHAMPHET SERPL QL: NEGATIVE NG/ML
OPIATES SERPL QL SCN: NEGATIVE NG/ML
OXYCODONE SERPL QL: NEGATIVE NG/ML
PCP SERPL QL SCN: NEGATIVE NG/ML

## 2025-06-08 LAB
CMV IGG SERPL IA-ACNC: <0.2 U/ML
EBV VCA IGG SER IA-ACNC: <10 U/ML (ref 0–21.9)
HIV-1 NAAT (COPIES/ML) L204479A: NOT DETECTED CPY/ML
HIV-1 NAAT (LOG COPIES/ML) L295410: NOT DETECTED LOG CPY/ML
HIV1 RNA SERPL QL NAA+PROBE: NOT DETECTED
HSV1 GG IGG SER-ACNC: 38.6 IV
HSV1+2 IGG SER IA-ACNC: >22.4 IV
HSV2 GG IGG SER-ACNC: 8.07 IV
MEV IGG SER-ACNC: 232 AU/ML
MUV IGG SER IA-ACNC: 40.1 AU/ML
RUBV AB SER QL: 144 IU/ML
STRONGYLOIDES IGG SER IA-ACNC: 0 IV
T GONDII IGG SER-ACNC: 8.5 IU/ML
VZV IGG SER IA-ACNC: 15.4 S/CO

## 2025-06-09 LAB
HBV DNA SERPL NAA+PROBE-ACNC: NOT DETECTED IU/ML
HBV DNA SERPL NAA+PROBE-LOG IU: NOT DETECTED LOG IU/ML
HBV DNA SERPL QL NAA+PROBE: NOT DETECTED

## 2025-06-11 ENCOUNTER — OFFICE VISIT (OUTPATIENT)
Dept: URGENT CARE | Facility: CLINIC | Age: 65
End: 2025-06-11
Payer: MEDICARE

## 2025-06-11 VITALS
RESPIRATION RATE: 16 BRPM | DIASTOLIC BLOOD PRESSURE: 86 MMHG | OXYGEN SATURATION: 96 % | BODY MASS INDEX: 19.63 KG/M2 | SYSTOLIC BLOOD PRESSURE: 138 MMHG | HEIGHT: 61 IN | WEIGHT: 104 LBS | HEART RATE: 87 BPM | TEMPERATURE: 97.4 F

## 2025-06-11 DIAGNOSIS — R10.32 LEFT LOWER QUADRANT ABDOMINAL PAIN: Primary | ICD-10-CM

## 2025-06-11 DIAGNOSIS — N18.6 END STAGE RENAL DISEASE ON DIALYSIS (HCC): ICD-10-CM

## 2025-06-11 DIAGNOSIS — Z99.2 END STAGE RENAL DISEASE ON DIALYSIS (HCC): ICD-10-CM

## 2025-06-11 PROCEDURE — 3075F SYST BP GE 130 - 139MM HG: CPT | Performed by: FAMILY MEDICINE

## 2025-06-11 PROCEDURE — 99205 OFFICE O/P NEW HI 60 MIN: CPT | Performed by: FAMILY MEDICINE

## 2025-06-11 PROCEDURE — 3079F DIAST BP 80-89 MM HG: CPT | Performed by: FAMILY MEDICINE

## 2025-06-11 ASSESSMENT — FIBROSIS 4 INDEX: FIB4 SCORE: 1.08

## 2025-06-11 NOTE — PROGRESS NOTES
"  Subjective:      64 y.o. female presents to urgent care for left flank / left lower abdominal pain that started several weeks ago. There was no inciting event or trauma at that time. She went to the ED 5/26/2025 and was referred to GI.  She did have both colonoscopy and endoscopy 5/8/2025 which reportedly showed an \"inflamed stomach\".  She was started on pantoprazole without any change in symptoms.  Pain is constant and progressively worsening, is described as feeling dull and inflamed, currently rated 10/10.  She does have associated nausea and vomiting.  Her last bowel movement was about 3 days ago and was reportedly small.  She does not make urine, but has dialysis Monday, Wednesday, and Friday.  She did attend this morning.    She denies any other questions or concerns at this time.    Current problem list, medication, and past medical/surgical history were reviewed in Epic.    ROS  See HPI     Objective:      /86   Pulse 87   Temp 36.3 °C (97.4 °F)   Resp 16   Ht 1.549 m (5' 1\")   Wt 47.2 kg (104 lb)   SpO2 96%   BMI 19.65 kg/m²     Physical Exam  Constitutional:       General: She is not in acute distress.     Appearance: She is not diaphoretic.   Cardiovascular:      Rate and Rhythm: Normal rate and regular rhythm.      Heart sounds: Normal heart sounds.   Pulmonary:      Effort: Pulmonary effort is normal. No respiratory distress.      Breath sounds: Normal breath sounds.   Abdominal:      General: Bowel sounds are normal.      Palpations: Abdomen is soft.      Tenderness: There is abdominal tenderness (to LLQ).   Neurological:      Mental Status: She is alert.   Psychiatric:         Mood and Affect: Affect normal.         Judgment: Judgment normal.       Assessment/Plan:     1. Left lower quadrant abdominal pain (Primary)  2. End stage renal disease on dialysis (HCC)  At this time, I feel the patient requires a higher level of care in the ED for closer monitoring, stat lab work and/or imaging " for further evaluation. This has been discussed with the patient and she states agreement and understanding. The patient is stable without the need for continuous monitoring and therefore will go via private vehicle to [facility] without delay.        Instructed to return to Urgent Care or nearest Emergency Department if symptoms fail to improve, for any change in condition, further concerns, or new concerning symptoms. Patient states understanding of the plan of care and discharge instructions.    Luma Ball M.D.

## 2025-06-12 LAB
COCCIDIOIDES IGG SER-ACNC: 0.1 IV
COCCIDIOIDES IGM SERPL-ACNC: 0.1 IV

## 2025-08-05 ENCOUNTER — TELEPHONE (OUTPATIENT)
Facility: MEDICAL CENTER | Age: 65
End: 2025-08-05
Payer: MEDICARE

## 2025-08-25 ENCOUNTER — TELEPHONE (OUTPATIENT)
Facility: MEDICAL CENTER | Age: 65
End: 2025-08-25
Payer: MEDICARE

## 2025-08-27 ENCOUNTER — COMMITTEE REVIEW (OUTPATIENT)
Facility: MEDICAL CENTER | Age: 65
End: 2025-08-27
Payer: MEDICARE

## 2025-08-28 ENCOUNTER — TELEPHONE (OUTPATIENT)
Facility: MEDICAL CENTER | Age: 65
End: 2025-08-28
Payer: MEDICARE

## (undated) DEVICE — BAG ISOLATION 20X20 INVISI - SHEILD (10EA/BX)

## (undated) DEVICE — SYRINGE 20 ML LL (50EA/BX 4BX/CA)

## (undated) DEVICE — TOWELS CLOTH SURGICAL - (4/PK 20PK/CA)

## (undated) DEVICE — GOWN WARMING STANDARD FLEX - (30/CA)

## (undated) DEVICE — GOWN SURGEONS X-LARGE - DISP. (30/CA)

## (undated) DEVICE — CLIP MED INTNL HRZN TI ESCP - (25/BX)

## (undated) DEVICE — GLOVE BIOGEL PI INDICATOR SZ 8.0 SURGICAL PF LF -(50/BX 4BX/CA)

## (undated) DEVICE — SLEEVE, VASO, THIGH, MED

## (undated) DEVICE — INSTRUMENT JAWCOVER SUTURE - BOOTS (5PK/BX)

## (undated) DEVICE — VESSELOOP MAXI BLUE STERILE- SURG-I-LOOP (10EA/BX)

## (undated) DEVICE — SET LEADWIRE 5 LEAD BEDSIDE DISPOSABLE ECG (1SET OF 5/EA)

## (undated) DEVICE — DRAPE LARGE 3 QUARTER - (20/CA)

## (undated) DEVICE — TOWEL STOP TIMEOUT SAFETY FLAG (40EA/CA)

## (undated) DEVICE — SUTURE 3-0 VICRYL PLUS SH - 8X 18 INCH (12/BX)

## (undated) DEVICE — VESSELOOP MINI BLUE STERILE - SURG-I-LOOP (10EA/BX)

## (undated) DEVICE — PAD PREP 24 X 48 CUFFED - (100/CA)

## (undated) DEVICE — LACTATED RINGERS INJ 1000 ML - (14EA/CA 60CA/PF)

## (undated) DEVICE — GLOVE BIOGEL INDICATOR SZ 7.5 SURGICAL PF LTX - (50PR/BX 4BX/CA)

## (undated) DEVICE — CLIP SM INTNL HRZN TI ESCP LGT - (24EA/PK 25PK/BX)

## (undated) DEVICE — GLOVE BIOGEL SZ 7 SURGICAL PF LTX - (50PR/BX 4BX/CA)

## (undated) DEVICE — ADHESIVE DERMABOND HVD MINI (12EA/BX)

## (undated) DEVICE — CANISTER SUCTION 3000ML MECHANICAL FILTER AUTO SHUTOFF MEDI-VAC NONSTERILE LF DISP  (40EA/CA)

## (undated) DEVICE — SUTURE GENERAL

## (undated) DEVICE — MASK AIRWAY SIZE 4 UNIQUE SILICON (10EA/BX)

## (undated) DEVICE — SUTURE 7-0 PROLENE BV-1 D/A 30 (36PK/BX)"

## (undated) DEVICE — TUBING CLEARLINK DUO-VENT - C-FLO (48EA/CA)

## (undated) DEVICE — SUTURE CV

## (undated) DEVICE — SUTURE 5-0 PROLENE C-1 D/A 24 (36PK/BX)"

## (undated) DEVICE — SUTURE 3-0 VICRYL PLUS SH - 27 INCH (36/BX)

## (undated) DEVICE — SYRINGE DISP. 12 CC LL - (100/BX)

## (undated) DEVICE — SET EXTENSION WITH 2 PORTS (48EA/CA) ***PART #2C8610 IS A SUBSTITUTE*****

## (undated) DEVICE — SODIUM CHL IRRIGATION 0.9% 1000ML (12EA/CA)

## (undated) DEVICE — CHLORAPREP 26 ML APPLICATOR - ORANGE TINT(25/CA)

## (undated) DEVICE — SUCTION INSTRUMENT YANKAUER BULBOUS TIP W/O VENT (50EA/CA)

## (undated) DEVICE — PACK AV FISTULA (4EA/CA)

## (undated) DEVICE — ELECTRODE DUAL RETURN W/ CORD - (50/PK)

## (undated) DEVICE — GLOVE SZ 7.5 BIOGEL PI MICRO - PF LF (50PR/BX)

## (undated) DEVICE — PAD LAP STERILE 18 X 18 - (5/PK 40PK/CA)

## (undated) DEVICE — SODIUM CHL. INJ. 0.9% 500ML (24EA/CA 50CA/PF)

## (undated) DEVICE — SUTURE 4-0 MONOCRYL PLUS PS-1 - 27 INCH (36/BX)

## (undated) DEVICE — SUTURE 6-0 PROLENE BV-1 D/A 24 (36PK/BX)"

## (undated) DEVICE — COVER LIGHT HANDLE ALC PLUS DISP (18EA/BX)

## (undated) DEVICE — SHEAR HS FOCUS 9CM CVD - (6/BX)

## (undated) DEVICE — SENSOR OXIMETER ADULT SPO2 RD SET (20EA/BX)